# Patient Record
Sex: MALE | Race: WHITE | NOT HISPANIC OR LATINO | Employment: UNEMPLOYED | URBAN - METROPOLITAN AREA
[De-identification: names, ages, dates, MRNs, and addresses within clinical notes are randomized per-mention and may not be internally consistent; named-entity substitution may affect disease eponyms.]

---

## 2017-01-01 ENCOUNTER — COMMUNICATION - HEALTHEAST (OUTPATIENT)
Dept: FAMILY MEDICINE | Facility: CLINIC | Age: 32
End: 2017-01-01

## 2017-01-05 ENCOUNTER — COMMUNICATION - HEALTHEAST (OUTPATIENT)
Dept: FAMILY MEDICINE | Facility: CLINIC | Age: 32
End: 2017-01-05

## 2017-03-23 ENCOUNTER — COMMUNICATION - HEALTHEAST (OUTPATIENT)
Dept: FAMILY MEDICINE | Facility: CLINIC | Age: 32
End: 2017-03-23

## 2017-03-24 ENCOUNTER — AMBULATORY - HEALTHEAST (OUTPATIENT)
Dept: FAMILY MEDICINE | Facility: CLINIC | Age: 32
End: 2017-03-24

## 2017-03-24 DIAGNOSIS — F98.8 ATTENTION DEFICIT DISORDER: ICD-10-CM

## 2017-04-13 ENCOUNTER — COMMUNICATION - HEALTHEAST (OUTPATIENT)
Dept: FAMILY MEDICINE | Facility: CLINIC | Age: 32
End: 2017-04-13

## 2017-04-13 DIAGNOSIS — Z87.891 QUIT SMOKING: ICD-10-CM

## 2017-04-25 ENCOUNTER — COMMUNICATION - HEALTHEAST (OUTPATIENT)
Dept: FAMILY MEDICINE | Facility: CLINIC | Age: 32
End: 2017-04-25

## 2017-05-02 ENCOUNTER — OFFICE VISIT - HEALTHEAST (OUTPATIENT)
Dept: FAMILY MEDICINE | Facility: CLINIC | Age: 32
End: 2017-05-02

## 2017-05-02 DIAGNOSIS — F98.8 ATTENTION DEFICIT DISORDER: ICD-10-CM

## 2017-05-02 DIAGNOSIS — F32.9 MAJOR DEPRESSIVE DISORDER, SINGLE EPISODE, UNSPECIFIED: ICD-10-CM

## 2017-05-02 DIAGNOSIS — Z79.899 MEDICATION MANAGEMENT: ICD-10-CM

## 2017-05-02 DIAGNOSIS — F41.1 ANXIETY STATE: ICD-10-CM

## 2017-05-02 DIAGNOSIS — E66.9 OBESITY: ICD-10-CM

## 2017-05-02 DIAGNOSIS — G47.00 INSOMNIA, UNSPECIFIED: ICD-10-CM

## 2017-05-02 ASSESSMENT — MIFFLIN-ST. JEOR: SCORE: 1898.82

## 2017-05-29 ENCOUNTER — COMMUNICATION - HEALTHEAST (OUTPATIENT)
Dept: FAMILY MEDICINE | Facility: CLINIC | Age: 32
End: 2017-05-29

## 2017-05-29 DIAGNOSIS — Z87.891 QUIT SMOKING: ICD-10-CM

## 2017-06-02 ENCOUNTER — COMMUNICATION - HEALTHEAST (OUTPATIENT)
Dept: FAMILY MEDICINE | Facility: CLINIC | Age: 32
End: 2017-06-02

## 2017-06-02 DIAGNOSIS — F98.8 ATTENTION DEFICIT DISORDER: ICD-10-CM

## 2017-06-30 ENCOUNTER — COMMUNICATION - HEALTHEAST (OUTPATIENT)
Dept: FAMILY MEDICINE | Facility: CLINIC | Age: 32
End: 2017-06-30

## 2017-06-30 DIAGNOSIS — F98.8 ATTENTION DEFICIT DISORDER: ICD-10-CM

## 2017-06-30 DIAGNOSIS — Z87.891 QUIT SMOKING: ICD-10-CM

## 2017-07-02 ENCOUNTER — COMMUNICATION - HEALTHEAST (OUTPATIENT)
Dept: SCHEDULING | Facility: CLINIC | Age: 32
End: 2017-07-02

## 2017-07-02 DIAGNOSIS — Z87.891 QUIT SMOKING: ICD-10-CM

## 2017-07-02 DIAGNOSIS — F98.8 ATTENTION DEFICIT DISORDER: ICD-10-CM

## 2017-08-01 ENCOUNTER — COMMUNICATION - HEALTHEAST (OUTPATIENT)
Dept: FAMILY MEDICINE | Facility: CLINIC | Age: 32
End: 2017-08-01

## 2017-08-01 DIAGNOSIS — F98.8 ATTENTION DEFICIT DISORDER: ICD-10-CM

## 2017-08-29 ENCOUNTER — COMMUNICATION - HEALTHEAST (OUTPATIENT)
Dept: FAMILY MEDICINE | Facility: CLINIC | Age: 32
End: 2017-08-29

## 2017-08-29 DIAGNOSIS — F98.8 ATTENTION DEFICIT DISORDER: ICD-10-CM

## 2017-09-15 ENCOUNTER — COMMUNICATION - HEALTHEAST (OUTPATIENT)
Dept: FAMILY MEDICINE | Facility: CLINIC | Age: 32
End: 2017-09-15

## 2017-09-25 ENCOUNTER — OFFICE VISIT - HEALTHEAST (OUTPATIENT)
Dept: FAMILY MEDICINE | Facility: CLINIC | Age: 32
End: 2017-09-25

## 2017-09-25 DIAGNOSIS — Z00.00 HEALTH CARE MAINTENANCE: ICD-10-CM

## 2017-09-25 DIAGNOSIS — F98.8 ATTENTION DEFICIT DISORDER: ICD-10-CM

## 2017-09-25 DIAGNOSIS — F32.9 MAJOR DEPRESSIVE DISORDER, SINGLE EPISODE, UNSPECIFIED: ICD-10-CM

## 2017-09-25 DIAGNOSIS — F41.1 ANXIETY STATE: ICD-10-CM

## 2017-09-25 ASSESSMENT — MIFFLIN-ST. JEOR: SCORE: 1853.46

## 2017-10-02 ENCOUNTER — COMMUNICATION - HEALTHEAST (OUTPATIENT)
Dept: FAMILY MEDICINE | Facility: CLINIC | Age: 32
End: 2017-10-02

## 2017-10-03 ENCOUNTER — OFFICE VISIT - HEALTHEAST (OUTPATIENT)
Dept: FAMILY MEDICINE | Facility: CLINIC | Age: 32
End: 2017-10-03

## 2017-10-03 DIAGNOSIS — F98.8 ATTENTION DEFICIT DISORDER: ICD-10-CM

## 2017-10-03 ASSESSMENT — MIFFLIN-ST. JEOR: SCORE: 1857.09

## 2017-10-27 ENCOUNTER — COMMUNICATION - HEALTHEAST (OUTPATIENT)
Dept: FAMILY MEDICINE | Facility: CLINIC | Age: 32
End: 2017-10-27

## 2017-10-27 DIAGNOSIS — F98.8 ATTENTION DEFICIT DISORDER: ICD-10-CM

## 2017-11-25 ENCOUNTER — COMMUNICATION - HEALTHEAST (OUTPATIENT)
Dept: FAMILY MEDICINE | Facility: CLINIC | Age: 32
End: 2017-11-25

## 2017-11-25 DIAGNOSIS — F98.8 ATTENTION DEFICIT DISORDER: ICD-10-CM

## 2017-12-13 ENCOUNTER — COMMUNICATION - HEALTHEAST (OUTPATIENT)
Dept: FAMILY MEDICINE | Facility: CLINIC | Age: 32
End: 2017-12-13

## 2017-12-19 ENCOUNTER — COMMUNICATION - HEALTHEAST (OUTPATIENT)
Dept: FAMILY MEDICINE | Facility: CLINIC | Age: 32
End: 2017-12-19

## 2017-12-19 DIAGNOSIS — F98.8 ATTENTION DEFICIT DISORDER: ICD-10-CM

## 2018-01-26 ENCOUNTER — COMMUNICATION - HEALTHEAST (OUTPATIENT)
Dept: FAMILY MEDICINE | Facility: CLINIC | Age: 33
End: 2018-01-26

## 2018-01-26 DIAGNOSIS — F98.8 ATTENTION DEFICIT DISORDER: ICD-10-CM

## 2018-02-26 ENCOUNTER — COMMUNICATION - HEALTHEAST (OUTPATIENT)
Dept: FAMILY MEDICINE | Facility: CLINIC | Age: 33
End: 2018-02-26

## 2018-02-26 DIAGNOSIS — F41.1 ANXIETY STATE: ICD-10-CM

## 2018-02-26 DIAGNOSIS — F98.8 ATTENTION DEFICIT DISORDER: ICD-10-CM

## 2018-02-26 DIAGNOSIS — F32.9 MAJOR DEPRESSIVE DISORDER, SINGLE EPISODE: ICD-10-CM

## 2018-03-13 ENCOUNTER — COMMUNICATION - HEALTHEAST (OUTPATIENT)
Dept: FAMILY MEDICINE | Facility: CLINIC | Age: 33
End: 2018-03-13

## 2018-03-23 ENCOUNTER — COMMUNICATION - HEALTHEAST (OUTPATIENT)
Dept: FAMILY MEDICINE | Facility: CLINIC | Age: 33
End: 2018-03-23

## 2018-03-23 DIAGNOSIS — F32.9 MAJOR DEPRESSIVE DISORDER, SINGLE EPISODE: ICD-10-CM

## 2018-03-23 DIAGNOSIS — F41.1 ANXIETY STATE: ICD-10-CM

## 2018-03-23 DIAGNOSIS — F98.8 ATTENTION DEFICIT DISORDER: ICD-10-CM

## 2018-04-21 ENCOUNTER — COMMUNICATION - HEALTHEAST (OUTPATIENT)
Dept: FAMILY MEDICINE | Facility: CLINIC | Age: 33
End: 2018-04-21

## 2018-04-21 DIAGNOSIS — F98.8 ATTENTION DEFICIT DISORDER: ICD-10-CM

## 2018-04-23 ENCOUNTER — COMMUNICATION - HEALTHEAST (OUTPATIENT)
Dept: FAMILY MEDICINE | Facility: CLINIC | Age: 33
End: 2018-04-23

## 2018-04-23 DIAGNOSIS — F98.8 ATTENTION DEFICIT DISORDER: ICD-10-CM

## 2018-05-21 ENCOUNTER — COMMUNICATION - HEALTHEAST (OUTPATIENT)
Dept: FAMILY MEDICINE | Facility: CLINIC | Age: 33
End: 2018-05-21

## 2018-05-21 DIAGNOSIS — F98.8 ATTENTION DEFICIT DISORDER: ICD-10-CM

## 2018-06-18 ENCOUNTER — COMMUNICATION - HEALTHEAST (OUTPATIENT)
Dept: FAMILY MEDICINE | Facility: CLINIC | Age: 33
End: 2018-06-18

## 2018-06-18 DIAGNOSIS — F98.8 ATTENTION DEFICIT DISORDER: ICD-10-CM

## 2018-06-23 ENCOUNTER — COMMUNICATION - HEALTHEAST (OUTPATIENT)
Dept: FAMILY MEDICINE | Facility: CLINIC | Age: 33
End: 2018-06-23

## 2018-06-23 DIAGNOSIS — F98.8 ATTENTION DEFICIT DISORDER: ICD-10-CM

## 2018-06-25 ENCOUNTER — COMMUNICATION - HEALTHEAST (OUTPATIENT)
Dept: FAMILY MEDICINE | Facility: CLINIC | Age: 33
End: 2018-06-25

## 2018-06-25 ENCOUNTER — AMBULATORY - HEALTHEAST (OUTPATIENT)
Dept: FAMILY MEDICINE | Facility: CLINIC | Age: 33
End: 2018-06-25

## 2018-06-25 DIAGNOSIS — F98.8 ATTENTION DEFICIT DISORDER: ICD-10-CM

## 2018-07-17 ENCOUNTER — OFFICE VISIT - HEALTHEAST (OUTPATIENT)
Dept: FAMILY MEDICINE | Facility: CLINIC | Age: 33
End: 2018-07-17

## 2018-07-17 DIAGNOSIS — F41.1 ANXIETY STATE: ICD-10-CM

## 2018-07-17 DIAGNOSIS — F98.8 ATTENTION DEFICIT DISORDER: ICD-10-CM

## 2018-07-17 DIAGNOSIS — E66.9 OBESITY: ICD-10-CM

## 2018-07-17 DIAGNOSIS — F32.0 MILD SINGLE CURRENT EPISODE OF MAJOR DEPRESSIVE DISORDER (H): ICD-10-CM

## 2018-07-17 ASSESSMENT — MIFFLIN-ST. JEOR: SCORE: 1897.46

## 2018-07-22 ENCOUNTER — COMMUNICATION - HEALTHEAST (OUTPATIENT)
Dept: FAMILY MEDICINE | Facility: CLINIC | Age: 33
End: 2018-07-22

## 2018-07-22 DIAGNOSIS — F98.8 ATTENTION DEFICIT DISORDER: ICD-10-CM

## 2018-08-21 ENCOUNTER — COMMUNICATION - HEALTHEAST (OUTPATIENT)
Dept: FAMILY MEDICINE | Facility: CLINIC | Age: 33
End: 2018-08-21

## 2018-08-21 DIAGNOSIS — F98.8 ATTENTION DEFICIT DISORDER: ICD-10-CM

## 2018-09-05 ENCOUNTER — COMMUNICATION - HEALTHEAST (OUTPATIENT)
Dept: FAMILY MEDICINE | Facility: CLINIC | Age: 33
End: 2018-09-05

## 2018-09-14 ENCOUNTER — COMMUNICATION - HEALTHEAST (OUTPATIENT)
Dept: FAMILY MEDICINE | Facility: CLINIC | Age: 33
End: 2018-09-14

## 2018-09-14 DIAGNOSIS — F41.1 ANXIETY STATE: ICD-10-CM

## 2018-09-14 DIAGNOSIS — F32.0 MILD SINGLE CURRENT EPISODE OF MAJOR DEPRESSIVE DISORDER (H): ICD-10-CM

## 2018-09-20 ENCOUNTER — AMBULATORY - HEALTHEAST (OUTPATIENT)
Dept: FAMILY MEDICINE | Facility: CLINIC | Age: 33
End: 2018-09-20

## 2018-09-22 ENCOUNTER — RECORDS - HEALTHEAST (OUTPATIENT)
Dept: ADMINISTRATIVE | Facility: OTHER | Age: 33
End: 2018-09-22

## 2018-10-22 ENCOUNTER — COMMUNICATION - HEALTHEAST (OUTPATIENT)
Dept: FAMILY MEDICINE | Facility: CLINIC | Age: 33
End: 2018-10-22

## 2018-10-31 ENCOUNTER — COMMUNICATION - HEALTHEAST (OUTPATIENT)
Dept: FAMILY MEDICINE | Facility: CLINIC | Age: 33
End: 2018-10-31

## 2018-11-19 ENCOUNTER — AMBULATORY - HEALTHEAST (OUTPATIENT)
Dept: FAMILY MEDICINE | Facility: CLINIC | Age: 33
End: 2018-11-19

## 2018-12-20 ENCOUNTER — COMMUNICATION - HEALTHEAST (OUTPATIENT)
Dept: FAMILY MEDICINE | Facility: CLINIC | Age: 33
End: 2018-12-20

## 2019-06-11 ENCOUNTER — OFFICE VISIT - HEALTHEAST (OUTPATIENT)
Dept: FAMILY MEDICINE | Facility: CLINIC | Age: 34
End: 2019-06-11

## 2019-06-11 ENCOUNTER — RECORDS - HEALTHEAST (OUTPATIENT)
Dept: ADMINISTRATIVE | Facility: OTHER | Age: 34
End: 2019-06-11

## 2019-06-11 DIAGNOSIS — M75.51 BURSITIS OF RIGHT SHOULDER: ICD-10-CM

## 2019-06-11 ASSESSMENT — MIFFLIN-ST. JEOR: SCORE: 1748.23

## 2019-06-14 ENCOUNTER — RECORDS - HEALTHEAST (OUTPATIENT)
Dept: ADMINISTRATIVE | Facility: OTHER | Age: 34
End: 2019-06-14

## 2019-07-22 ENCOUNTER — OFFICE VISIT - HEALTHEAST (OUTPATIENT)
Dept: FAMILY MEDICINE | Facility: CLINIC | Age: 34
End: 2019-07-22

## 2019-07-22 DIAGNOSIS — M75.51 BURSITIS OF RIGHT SHOULDER: ICD-10-CM

## 2019-07-22 DIAGNOSIS — Z01.818 ENCOUNTER FOR PREOPERATIVE EXAMINATION FOR GENERAL SURGICAL PROCEDURE: ICD-10-CM

## 2019-07-22 DIAGNOSIS — D17.30 LIPOMA OF SKIN AND SUBCUTANEOUS TISSUE: ICD-10-CM

## 2019-07-22 LAB — HGB BLD-MCNC: 13.8 G/DL (ref 14–18)

## 2019-07-22 ASSESSMENT — MIFFLIN-ST. JEOR: SCORE: 1759.11

## 2019-07-25 ENCOUNTER — RECORDS - HEALTHEAST (OUTPATIENT)
Dept: ADMINISTRATIVE | Facility: OTHER | Age: 34
End: 2019-07-25

## 2019-07-29 ENCOUNTER — RECORDS - HEALTHEAST (OUTPATIENT)
Dept: ADMINISTRATIVE | Facility: OTHER | Age: 34
End: 2019-07-29

## 2019-07-29 LAB
LAB AP CHARGES (HE HISTORICAL CONVERSION): NORMAL
PATH REPORT.COMMENTS IMP SPEC: NORMAL
PATH REPORT.FINAL DX SPEC: NORMAL
PATH REPORT.GROSS SPEC: NORMAL
PATH REPORT.MICROSCOPIC SPEC OTHER STN: NORMAL
PATH REPORT.RELEVANT HX SPEC: NORMAL
RESULT FLAG (HE HISTORICAL CONVERSION): NORMAL

## 2019-12-14 ENCOUNTER — RECORDS - HEALTHEAST (OUTPATIENT)
Dept: ADMINISTRATIVE | Facility: OTHER | Age: 34
End: 2019-12-14

## 2019-12-21 ENCOUNTER — RECORDS - HEALTHEAST (OUTPATIENT)
Dept: ADMINISTRATIVE | Facility: OTHER | Age: 34
End: 2019-12-21

## 2020-03-06 ENCOUNTER — OFFICE VISIT - HEALTHEAST (OUTPATIENT)
Dept: FAMILY MEDICINE | Facility: CLINIC | Age: 35
End: 2020-03-06

## 2020-03-06 DIAGNOSIS — G44.201 ACUTE INTRACTABLE TENSION-TYPE HEADACHE: ICD-10-CM

## 2020-03-06 DIAGNOSIS — R73.9 HYPERGLYCEMIA: ICD-10-CM

## 2020-03-06 DIAGNOSIS — E83.42 HYPOMAGNESEMIA: ICD-10-CM

## 2020-03-13 ENCOUNTER — COMMUNICATION - HEALTHEAST (OUTPATIENT)
Dept: FAMILY MEDICINE | Facility: CLINIC | Age: 35
End: 2020-03-13

## 2020-03-13 DIAGNOSIS — G44.201 ACUTE INTRACTABLE TENSION-TYPE HEADACHE: ICD-10-CM

## 2020-03-25 ENCOUNTER — COMMUNICATION - HEALTHEAST (OUTPATIENT)
Dept: FAMILY MEDICINE | Facility: CLINIC | Age: 35
End: 2020-03-25

## 2020-03-25 DIAGNOSIS — R11.0 NAUSEA: ICD-10-CM

## 2020-03-25 DIAGNOSIS — R51.9 ACUTE INTRACTABLE HEADACHE, UNSPECIFIED HEADACHE TYPE: ICD-10-CM

## 2020-06-30 ENCOUNTER — COMMUNICATION - HEALTHEAST (OUTPATIENT)
Dept: FAMILY MEDICINE | Facility: CLINIC | Age: 35
End: 2020-06-30

## 2020-07-06 ENCOUNTER — COMMUNICATION - HEALTHEAST (OUTPATIENT)
Dept: FAMILY MEDICINE | Facility: CLINIC | Age: 35
End: 2020-07-06

## 2020-08-28 ENCOUNTER — COMMUNICATION - HEALTHEAST (OUTPATIENT)
Dept: FAMILY MEDICINE | Facility: CLINIC | Age: 35
End: 2020-08-28

## 2020-08-28 ENCOUNTER — OFFICE VISIT - HEALTHEAST (OUTPATIENT)
Dept: FAMILY MEDICINE | Facility: CLINIC | Age: 35
End: 2020-08-28

## 2020-08-28 DIAGNOSIS — E66.09 CLASS 2 OBESITY DUE TO EXCESS CALORIES WITHOUT SERIOUS COMORBIDITY WITH BODY MASS INDEX (BMI) OF 36.0 TO 36.9 IN ADULT: ICD-10-CM

## 2020-08-28 DIAGNOSIS — E66.812 CLASS 2 OBESITY DUE TO EXCESS CALORIES WITHOUT SERIOUS COMORBIDITY WITH BODY MASS INDEX (BMI) OF 36.0 TO 36.9 IN ADULT: ICD-10-CM

## 2020-08-28 DIAGNOSIS — R73.9 HYPERGLYCEMIA: ICD-10-CM

## 2020-08-28 DIAGNOSIS — E83.42 HYPOMAGNESEMIA: ICD-10-CM

## 2020-08-28 DIAGNOSIS — Z13.220 LIPID SCREENING: ICD-10-CM

## 2020-08-28 DIAGNOSIS — Z00.00 ROUTINE GENERAL MEDICAL EXAMINATION AT A HEALTH CARE FACILITY: ICD-10-CM

## 2020-08-28 DIAGNOSIS — F33.1 MODERATE EPISODE OF RECURRENT MAJOR DEPRESSIVE DISORDER (H): ICD-10-CM

## 2020-08-28 DIAGNOSIS — F90.2 ATTENTION DEFICIT HYPERACTIVITY DISORDER (ADHD), COMBINED TYPE: ICD-10-CM

## 2020-08-28 DIAGNOSIS — Z71.6 ENCOUNTER FOR SMOKING CESSATION COUNSELING: ICD-10-CM

## 2020-08-28 LAB
CHOLEST SERPL-MCNC: 218 MG/DL
FASTING STATUS PATIENT QL REPORTED: YES
FASTING STATUS PATIENT QL REPORTED: YES
GLUCOSE BLD-MCNC: 78 MG/DL (ref 70–125)
HBA1C MFR BLD: 4.5 %
HDLC SERPL-MCNC: 49 MG/DL
LDLC SERPL CALC-MCNC: 145 MG/DL
MAGNESIUM SERPL-MCNC: 1.9 MG/DL (ref 1.8–2.6)
TRIGL SERPL-MCNC: 118 MG/DL
TSH SERPL DL<=0.005 MIU/L-ACNC: 3.28 UIU/ML (ref 0.3–5)

## 2020-08-28 ASSESSMENT — PATIENT HEALTH QUESTIONNAIRE - PHQ9: SUM OF ALL RESPONSES TO PHQ QUESTIONS 1-9: 17

## 2020-08-28 ASSESSMENT — MIFFLIN-ST. JEOR: SCORE: 1965.38

## 2020-09-11 ENCOUNTER — COMMUNICATION - HEALTHEAST (OUTPATIENT)
Dept: SURGERY | Facility: CLINIC | Age: 35
End: 2020-09-11

## 2020-09-11 ENCOUNTER — OFFICE VISIT - HEALTHEAST (OUTPATIENT)
Dept: SURGERY | Facility: CLINIC | Age: 35
End: 2020-09-11

## 2020-09-11 DIAGNOSIS — E78.5 DYSLIPIDEMIA: ICD-10-CM

## 2020-09-11 DIAGNOSIS — G43.809 OTHER MIGRAINE WITHOUT STATUS MIGRAINOSUS, NOT INTRACTABLE: ICD-10-CM

## 2020-09-11 DIAGNOSIS — E66.9 OBESITY (BMI 35.0-39.9 WITHOUT COMORBIDITY): ICD-10-CM

## 2020-09-11 DIAGNOSIS — F41.9 ANXIETY: ICD-10-CM

## 2020-09-11 ASSESSMENT — MIFFLIN-ST. JEOR: SCORE: 1950.86

## 2020-09-15 ENCOUNTER — OFFICE VISIT - HEALTHEAST (OUTPATIENT)
Dept: SURGERY | Facility: CLINIC | Age: 35
End: 2020-09-15

## 2020-09-15 DIAGNOSIS — E66.9 OBESITY (BMI 30-39.9): ICD-10-CM

## 2020-10-16 ENCOUNTER — OFFICE VISIT - HEALTHEAST (OUTPATIENT)
Dept: SURGERY | Facility: CLINIC | Age: 35
End: 2020-10-16

## 2020-10-16 ENCOUNTER — COMMUNICATION - HEALTHEAST (OUTPATIENT)
Dept: SURGERY | Facility: CLINIC | Age: 35
End: 2020-10-16

## 2020-10-16 DIAGNOSIS — E78.5 DYSLIPIDEMIA: ICD-10-CM

## 2020-10-16 DIAGNOSIS — E66.9 OBESITY (BMI 35.0-39.9 WITHOUT COMORBIDITY): ICD-10-CM

## 2020-10-16 ASSESSMENT — MIFFLIN-ST. JEOR: SCORE: 1923.65

## 2020-11-30 ENCOUNTER — COMMUNICATION - HEALTHEAST (OUTPATIENT)
Dept: PHARMACY | Facility: CLINIC | Age: 35
End: 2020-11-30

## 2020-11-30 DIAGNOSIS — F90.2 ATTENTION DEFICIT HYPERACTIVITY DISORDER (ADHD), COMBINED TYPE: ICD-10-CM

## 2020-12-02 ENCOUNTER — COMMUNICATION - HEALTHEAST (OUTPATIENT)
Dept: FAMILY MEDICINE | Facility: CLINIC | Age: 35
End: 2020-12-02

## 2020-12-02 DIAGNOSIS — F90.2 ATTENTION DEFICIT HYPERACTIVITY DISORDER (ADHD), COMBINED TYPE: ICD-10-CM

## 2020-12-04 ENCOUNTER — OFFICE VISIT - HEALTHEAST (OUTPATIENT)
Dept: SURGERY | Facility: CLINIC | Age: 35
End: 2020-12-04

## 2020-12-04 DIAGNOSIS — E78.5 DYSLIPIDEMIA: ICD-10-CM

## 2020-12-04 DIAGNOSIS — E66.9 OBESITY (BMI 35.0-39.9 WITHOUT COMORBIDITY): ICD-10-CM

## 2020-12-04 ASSESSMENT — MIFFLIN-ST. JEOR: SCORE: 1987.15

## 2020-12-18 ENCOUNTER — COMMUNICATION - HEALTHEAST (OUTPATIENT)
Dept: SURGERY | Facility: CLINIC | Age: 35
End: 2020-12-18

## 2021-03-04 ENCOUNTER — AMBULATORY - HEALTHEAST (OUTPATIENT)
Dept: SURGERY | Facility: CLINIC | Age: 36
End: 2021-03-04

## 2021-03-04 DIAGNOSIS — E78.5 DYSLIPIDEMIA: ICD-10-CM

## 2021-03-04 DIAGNOSIS — E66.9 OBESITY (BMI 35.0-39.9 WITHOUT COMORBIDITY): ICD-10-CM

## 2021-03-04 RX ORDER — PHENTERMINE HYDROCHLORIDE 37.5 MG/1
TABLET ORAL
Qty: 90 TABLET | Refills: 1 | Status: SHIPPED | OUTPATIENT
Start: 2021-03-04 | End: 2022-01-28

## 2021-04-02 ENCOUNTER — OFFICE VISIT - HEALTHEAST (OUTPATIENT)
Dept: SURGERY | Facility: CLINIC | Age: 36
End: 2021-04-02

## 2021-04-02 ENCOUNTER — COMMUNICATION - HEALTHEAST (OUTPATIENT)
Dept: SURGERY | Facility: CLINIC | Age: 36
End: 2021-04-02

## 2021-04-02 DIAGNOSIS — R63.8 ABNORMAL CRAVING: ICD-10-CM

## 2021-04-02 DIAGNOSIS — E66.9 OBESITY (BMI 35.0-39.9 WITHOUT COMORBIDITY): ICD-10-CM

## 2021-04-02 RX ORDER — NALTREXONE HYDROCHLORIDE 50 MG/1
25-50 TABLET, FILM COATED ORAL DAILY
Qty: 90 TABLET | Status: SHIPPED | OUTPATIENT
Start: 2021-04-02 | End: 2022-01-28

## 2021-04-02 ASSESSMENT — MIFFLIN-ST. JEOR: SCORE: 1996.22

## 2021-05-03 ENCOUNTER — AMBULATORY - HEALTHEAST (OUTPATIENT)
Dept: NURSING | Facility: CLINIC | Age: 36
End: 2021-05-03

## 2021-05-24 ENCOUNTER — AMBULATORY - HEALTHEAST (OUTPATIENT)
Dept: NURSING | Facility: CLINIC | Age: 36
End: 2021-05-24

## 2021-05-27 ASSESSMENT — PATIENT HEALTH QUESTIONNAIRE - PHQ9: SUM OF ALL RESPONSES TO PHQ QUESTIONS 1-9: 17

## 2021-05-29 NOTE — PROGRESS NOTES
Assessment and Plan:     1. Bursitis of right shoulder  Discussed symptomatic treatment including rest, ice, NSAIDs.  Patient is not interested work restrictions at this time.  He would like to see orthopedics for further guidance.  Discussed possible physical therapy and cortisone injection.  He is content with the plan.  - Ambulatory referral to Orthopedics    Subjective:     Seamus is a 33 y.o. male presenting to the clinic for concerns for right shoulder pain since January.  Patient works for the [employer].  He left work on 1/7/2019 due to right shoulder pain.  He was seen at walk-in clinic through [facility] where he was diagnosed with bursitis.  X-ray showed no acute fracture.  He was provided limited duty restrictions.  Since then, he has been experiencing sharp intermittent pain with abduction of the shoulder.  He has been working without restrictions but feels as though his symptoms are worsening.  He has been taking ibuprofen as needed.  He denies history of injury in the past.  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  Rest assists the pain.    Review of Systems: A complete 14 point review of systems was obtained and is negative or as stated in the history of present illness.    Social History     Socioeconomic History     Marital status: Single     Spouse name: Not on file     Number of children: Not on file     Years of education: Not on file     Highest education level: Not on file   Occupational History     Not on file   Social Needs     Financial resource strain: Not on file     Food insecurity:     Worry: Not on file     Inability: Not on file     Transportation needs:     Medical: Not on file     Non-medical: Not on file   Tobacco Use     Smoking status: Former Smoker     Smokeless tobacco: Never Used   Substance and Sexual Activity     Alcohol use: No     Drug use: No     Sexual activity: Yes     Partners: Female     Comment: in relationship    Lifestyle     Physical activity:     Days  "per week: Not on file     Minutes per session: Not on file     Stress: Not on file   Relationships     Social connections:     Talks on phone: Not on file     Gets together: Not on file     Attends Buddhism service: Not on file     Active member of club or organization: Not on file     Attends meetings of clubs or organizations: Not on file     Relationship status: Not on file     Intimate partner violence:     Fear of current or ex partner: Not on file     Emotionally abused: Not on file     Physically abused: Not on file     Forced sexual activity: Not on file   Other Topics Concern     Not on file   Social History Narrative    Works as political organizers.       Active Ambulatory Problems     Diagnosis Date Noted     Major depressive disorder, single episode      Anxiety      ADHD, Predominantly Inattentive Type      Insomnia      Deviated Nasal Septum (Acquired)      Hypertrophied Nasal Turbinate      Controlled substance agreement signed 11/16/2015     Resolved Ambulatory Problems     Diagnosis Date Noted     Skin Neoplasm Of Uncertain Behavior      Cellulitis of hand, left 01/04/2017     Cat bite, initial encounter      Past Medical History:   Diagnosis Date     ADD (attention deficit disorder)        Family History   Problem Relation Age of Onset     No Medical Problems Mother      No Medical Problems Father        Objective:     BP 94/58   Pulse 65   Ht 5' 7\" (1.702 m)   Wt 186 lb 3.2 oz (84.5 kg)   SpO2 97%   BMI 29.16 kg/m      Patient is alert, in no obvious distress.   Skin: Warm, dry.    Lungs:  Clear to auscultation. Respirations even and unlabored.  No wheezing or rales noted.   Heart:  Regular rate and rhythm.  No murmurs.   Musculoskeletal:  He has full ROM of his right shoulder.  He has obvious swelling within the bursa.  Neer's impingement and Reynolds are negative.              "

## 2021-05-30 ENCOUNTER — RECORDS - HEALTHEAST (OUTPATIENT)
Dept: ADMINISTRATIVE | Facility: CLINIC | Age: 36
End: 2021-05-30

## 2021-05-30 VITALS — WEIGHT: 219.4 LBS | HEIGHT: 67 IN | BODY MASS INDEX: 34.44 KG/M2

## 2021-05-30 NOTE — PATIENT INSTRUCTIONS - HE
Hold all supplements, aspirin and NSAIDs for 7 days prior to surgery.  Follow your surgeon's direction on when to stop eating and drinking prior to surgery.  Your surgeon will be managing your pain after your surgery.    Remove all jewelry and metal piercings before your surgery.

## 2021-05-30 NOTE — PROGRESS NOTES
Preoperative Exam    Scheduled Procedure: Right shoulder surgery  Surgery Date:7/25/19  Surgery Location: [facility]    Surgeon:      Assessment/Plan:     1. Encounter for preoperative examination for general surgical procedure  Patient will hold NSAIDs for 7 days prior to surgery.  - Hemoglobin    2. Lipoma of skin and subcutaneous tissue    3. Bursitis of right shoulder      Surgical Procedure Risk: Intermediate (reported cardiac risk generally 1-5%)  Have you had prior anesthesia?: Yes  Have you or any family members had a previous anesthesia reaction:  No  Do you or any family members have a history of a clotting or bleeding disorder?: No  Cardiac Risk Assessment: no increased risk for major cardiac complications    APPROVAL GIVEN to proceed with proposed procedure, without further diagnostic evaluation    Functional Status: Independent  Patient plans to recover at home with family.     Subjective:      Seamus Pereira is a 33 y.o. male who presents for a preoperative consultation.  Patient works for the [employer].  He left work on 1/7/2019 due to right shoulder pain.  He was seen at walk-in clinic through [facility] where he was diagnosed with bursitis.  X-ray showed no acute fracture.  Patient then saw [facility] where he received a cortisone injection and completed physical therapy.  Due to lack of improvement in symptoms, he had an ultrasound recently showing a lipoma.  He continues to experience intermittent pain primarily with abduction.  He has been taking ibuprofen as needed.    He has a history of ADHD, anxiety, depression, insomnia.  He is not currently taking medication.    All other systems reviewed and are negative, other than those listed in the HPI.    Pertinent History  Do you have difficulty breathing or chest pain after walking up a flight of stairs: No  History of obstructive sleep apnea: No  Steroid use in the last 6 months: No  Frequent Aspirin/NSAID use:  No  Prior Blood Transfusion: No  Prior Blood Transfusion Reaction: No  If for some reason prior to, during or after the procedure, if it is medically indicated, would you be willing to have a blood transfusion?:  There is no transfusion refusal.    No current outpatient medications on file.     No current facility-administered medications for this visit.         Allergies   Allergen Reactions     Amoxapine      Patient unsure of this allergy     Amoxicillin Unknown     Unknown reaction as a baby  Pt states unsure of reaction-childhood        Patient Active Problem List   Diagnosis     Major depressive disorder, single episode     Anxiety     ADHD, Predominantly Inattentive Type     Insomnia     Deviated Nasal Septum (Acquired)     Hypertrophied Nasal Turbinate     Controlled substance agreement signed       Past Medical History:   Diagnosis Date     ADD (attention deficit disorder)        Past Surgical History:   Procedure Laterality Date     KNEE ARTHROSCOPY Right      NC REMOVE TONSILS/ADENOIDS,12+ Y/O      Description: Tonsillectomy With Adenoidectomy Over Age 12;  Recorded: 02/15/2008;       Social History     Socioeconomic History     Marital status: Single     Spouse name: Not on file     Number of children: Not on file     Years of education: Not on file     Highest education level: Not on file   Occupational History     Not on file   Social Needs     Financial resource strain: Not on file     Food insecurity:     Worry: Not on file     Inability: Not on file     Transportation needs:     Medical: Not on file     Non-medical: Not on file   Tobacco Use     Smoking status: Former Smoker     Smokeless tobacco: Never Used   Substance and Sexual Activity     Alcohol use: No     Drug use: No     Sexual activity: Yes     Partners: Female     Comment: in relationship    Lifestyle     Physical activity:     Days per week: Not on file     Minutes per session: Not on file     Stress: Not on file   Relationships      "Social connections:     Talks on phone: Not on file     Gets together: Not on file     Attends Jain service: Not on file     Active member of club or organization: Not on file     Attends meetings of clubs or organizations: Not on file     Relationship status: Not on file     Intimate partner violence:     Fear of current or ex partner: Not on file     Emotionally abused: Not on file     Physically abused: Not on file     Forced sexual activity: Not on file   Other Topics Concern     Not on file   Social History Narrative    Works as political organizers.       Patient Care Team:  Elvira Rodas CNP as PCP - General          Objective:     Vitals:    07/22/19 1421   BP: 102/62   Pulse: (!) 54   SpO2: 98%   Weight: 188 lb 9.6 oz (85.5 kg)   Height: 5' 7\" (1.702 m)         Physical Exam:  Physical Exam   /62   Pulse (!) 54   Ht 5' 7\" (1.702 m)   Wt 188 lb 9.6 oz (85.5 kg)   SpO2 98%   BMI 29.54 kg/m      General Appearance:    Alert, cooperative, no distress, appears stated age   Head:    Normocephalic, without obvious abnormality, atraumatic   Eyes:    PERRL, conjunctiva/corneas clear, EOM's intact, fundi     benign, both eyes        Ears:    Normal TM's and external ear canals, both ears   Nose:   Nares normal, septum midline, mucosa normal, no drainage    or sinus tenderness   Throat:   Lips, mucosa, and tongue normal; teeth and gums normal   Neck:   Supple, symmetrical, trachea midline, no adenopathy;        thyroid:  No enlargement/tenderness/nodules; no carotid    bruit or JVD   Back:     Symmetric, no curvature, ROM normal, no CVA tenderness   Lungs:     Clear to auscultation bilaterally, respirations unlabored   Chest wall:    No tenderness or deformity   Heart:    Regular rate and rhythm, S1 and S2 normal, no murmur, rub    or gallop   Abdomen:     Soft, non-tender, bowel sounds active all four quadrants,     no masses, no organomegaly   Genitalia:    deferred   Rectal:    deferred "   Extremities:   He has a golf ball size soft tissue mass noted within his posterior lateral right shoulder     Pulses:   2+ and symmetric all extremities   Skin:   Skin color, texture, turgor normal, no rashes or lesions   Lymph nodes:   Cervical, supraclavicular, and axillary nodes normal   Neurologic:   CNII-XII intact. Normal strength, sensation and reflexes       throughout       Patient Instructions     Hold all supplements, aspirin and NSAIDs for 7 days prior to surgery.  Follow your surgeon's direction on when to stop eating and drinking prior to surgery.  Your surgeon will be managing your pain after your surgery.    Remove all jewelry and metal piercings before your surgery.       Labs:  Recent Results (from the past 24 hour(s))   Hemoglobin    Collection Time: 07/22/19  2:48 PM   Result Value Ref Range    Hemoglobin 13.8 (L) 14.0 - 18.0 g/dL       Immunization History   Administered Date(s) Administered     Influenza, seasonal,quad inj 36+ mos 09/25/2017     Influenza, seasonal,quad inj 6-35 mos 10/14/2014     Tdap 08/16/2011           Electronically signed by Elvira Rodas CNP 07/22/19 2:24 PM

## 2021-05-31 ENCOUNTER — RECORDS - HEALTHEAST (OUTPATIENT)
Dept: ADMINISTRATIVE | Facility: CLINIC | Age: 36
End: 2021-05-31

## 2021-05-31 VITALS — HEIGHT: 67 IN | BODY MASS INDEX: 32.99 KG/M2 | WEIGHT: 210.2 LBS

## 2021-05-31 VITALS — HEIGHT: 67 IN | WEIGHT: 209.4 LBS | BODY MASS INDEX: 32.87 KG/M2

## 2021-06-01 VITALS — WEIGHT: 219.1 LBS | HEIGHT: 67 IN | BODY MASS INDEX: 34.39 KG/M2

## 2021-06-03 VITALS — WEIGHT: 186.2 LBS | HEIGHT: 67 IN | BODY MASS INDEX: 29.22 KG/M2

## 2021-06-03 VITALS — BODY MASS INDEX: 29.6 KG/M2 | HEIGHT: 67 IN | WEIGHT: 188.6 LBS

## 2021-06-04 VITALS
DIASTOLIC BLOOD PRESSURE: 62 MMHG | BODY MASS INDEX: 34.46 KG/M2 | WEIGHT: 220 LBS | HEART RATE: 76 BPM | SYSTOLIC BLOOD PRESSURE: 114 MMHG | OXYGEN SATURATION: 99 %

## 2021-06-04 VITALS
HEART RATE: 75 BPM | SYSTOLIC BLOOD PRESSURE: 128 MMHG | WEIGHT: 233.2 LBS | DIASTOLIC BLOOD PRESSURE: 70 MMHG | OXYGEN SATURATION: 99 % | BODY MASS INDEX: 36.6 KG/M2 | HEIGHT: 67 IN

## 2021-06-04 VITALS — HEIGHT: 67 IN | WEIGHT: 230 LBS | BODY MASS INDEX: 36.1 KG/M2

## 2021-06-05 VITALS — HEIGHT: 67 IN | BODY MASS INDEX: 35.16 KG/M2 | WEIGHT: 224 LBS

## 2021-06-05 VITALS — BODY MASS INDEX: 37.35 KG/M2 | WEIGHT: 238 LBS | HEIGHT: 67 IN

## 2021-06-05 VITALS — HEIGHT: 67 IN | BODY MASS INDEX: 37.67 KG/M2 | WEIGHT: 240 LBS

## 2021-06-06 NOTE — TELEPHONE ENCOUNTER
RN cannot approve Refill Request    RN can NOT refill this medication med is not covered by policy/route to provider     . Last office visit: 3/6/2020 Elvira Rodas CNP Last Physical: 7/22/2019 Last MTM visit: Visit date not found Last visit same specialty: 3/6/2020 Elvira Rodas CNP.  Next visit within 3 mo: Visit date not found  Next physical within 3 mo: Visit date not found      Giselle Haney, [facility] Triage/Med Refill 3/16/2020    Requested Prescriptions   Pending Prescriptions Disp Refills     methocarbamoL (ROBAXIN) 500 MG tablet [Pharmacy Med Name: METHOCARBAMOL 500MG TABLETS] 30 tablet 0     Sig: TAKE 1 TABLET BY MOUTH THREE TIMES DAILY AS NEEDED.       There is no refill protocol information for this order

## 2021-06-06 NOTE — PROGRESS NOTES
Hospital Follow-up Visit:    Assessment/Plan:     1. Acute intractable tension-type headache  methocarbamoL (ROBAXIN) 500 MG tablet   2. Hypomagnesemia  Magnesium   3. Hyperglycemia  Glycosylated Hemoglobin A1c     Differentials include tension headache, migraine headache, cluster headache, dehydration.  Patient is experiencing some tension within his neck.  Will treat with methocarbamol as needed.  Discussed symptomatic treatment including rest, ice, stretching activities.  He will follow-up in 2 to 3 weeks to recheck magnesium, check A1c due to mild hyperglycemia during hospitalization.  He is to increase fluid intake.  He is prescribed sumatriptan to use as needed for the headache and Zofran to use as needed for the nausea.  He is to follow-up if symptoms persist or worsen.       Subjective:     Seamus Pereira is a 34 y.o. male who presents for a hospital discharge follow up.  Patient developed headaches over the past week.  He initially had what he perceived to be a migraine headache which lasted for 1 day.  He had nausea without vomiting.  Wednesday, he came home from a job interview.  He laid down and felt as though the muscles in his neck were tense.  He had numbness within his left arm.  He presented to the emergency room with severe occipital headache, nausea, vomiting, confusion, disorientation.  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  He received multiple medications including Toradol, Benadryl, Decadron, lorazepam, Compazine.  Patient had normal brain MRI/MRA.  Neurology suspected possible migraine and patient was discharged home with sumatriptan, Excedrin, Zofran.  Patient states his headache did resolve yesterday.  He denies headache today.  He still feels some neck tension.  He was noted to have hypomagnesemia and was prescribed magnesium gluconate 54 mg to take at bedtime.  He was also noted to have mild  hyperglycemia.      Hospital/Nursing Home/IP Rehab Facility: [facility]  Date of Admission: 3/4/20  Date of Discharge:3/5/20  Reason(s) for Admission:headache            Do you have any problems taking your medication regularly?  None       Have you had any changes in your medication since discharge? None       Have you had any difficulty following your discharge or treatment plan?  No    Summary of hospitalization:  Hospital discharge summary cannot be reviewed as it is not complete   Diagnostic Tests/Treatments reviewed.  Follow up needed: None  Other Healthcare Providers Involved in Patient's Care: Patient Care Team:  Elvira Rodas CNP as PCP - General  Elvira Rodas CNP as Assigned PCP      Update since discharge: {improved   Information from family, SNF, care coordination: none     Post Discharge Medication Reconciliation: discharge medications reconciled, continue medications without change  Plan of care communicated with: patient    Objective:     Vitals:    03/06/20 0903   BP: 114/62   Pulse: 76   SpO2: 99%   Weight: 220 lb (99.8 kg)         Physical Exam:  Physical Examination: GENERAL ASSESSMENT: active, alert, no acute distress, well hydrated, well nourished  SKIN: no lesions, jaundice, petechiae, pallor, cyanosis, ecchymosis  HEAD: Atraumatic, normocephalic  EYES: PERRL  EOM intact  EARS: bilateral TM's and external ear canals normal  NOSE: nasal mucosa, septum, turbinates normal bilaterally  MOUTH: mucous membranes moist and normal tonsils  NECK: supple, full range of motion, no mass, normal lymphadenopathy, no thyromegaly  CHEST: clear to auscultation, no wheezes, rales, or rhonchi, no tachypnea, retractions, or cyanosis  LUNGS: Respiratory effort normal, clear to auscultation, normal breath sounds bilaterally  HEART: Regular rate and rhythm, normal S1/S2, no murmurs, normal pulses and capillary fill  EXTREMITY: Normal muscle tone. All joints with full range of motion. No deformity or  tenderness.  NEURO: cranial nerves 2-12 normal, gross motor exam normal by observation, strength normal and symmetric, DTR normal for age      Coding guidelines for this visit:  Type of Medical   Decision Making Face-to-Face Visit       within 7 Days of discharge Face-to-Face Visit        within 14 days of discharge   Moderate Complexity [id number]   High Complexity [id number]       Electronically signed by Elvira Rodas CNP 03/06/20 9:08 AM

## 2021-06-10 NOTE — PROGRESS NOTES
Seamus is a 31 y.o. male presenting to the clinic for medication management.  Patient has ADHD and is taking Adderall XR 20 mg daily.  He denies any side effects from the medication.  He feels as though the medication assists him with staying on task.  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  He is trying to find a job.  He has had 2 job interviews recently.  He would like to run for political [employer] next year.  Patient has a girlfriend and they have a 2-year-old daughter.  She is working as a pharmacy tech at [employer].  He denies thoughts of suicide.  He does have anxiety and depression.  He is currently taking bupropion and states this is well controlled.  He had gained weight which is causing him some anxiety.  He lost 40 pounds recently by eating healthy and exercising.  He is trying to quit smoking.  He is currently using a 21 mg patch and has not smoked for 9 months.    Review of Systems: A complete 14 point review of systems was obtained and is negative or as stated in the history of present illness.    Social History     Social History     Marital status: Single     Spouse name: N/A     Number of children: N/A     Years of education: N/A     Occupational History     Not on file.     Social History Main Topics     Smoking status: Former Smoker     Smokeless tobacco: Not on file     Alcohol use No     Drug use: No     Sexual activity: Yes     Partners: Female      Comment: in relationship      Other Topics Concern     Not on file     Social History Narrative    Works as political organizers.       Active Ambulatory Problems     Diagnosis Date Noted     Major Depression, Single Episode      Anxiety      ADHD, Predominantly Inattentive Type      Insomnia      Deviated Nasal Septum (Acquired)      Hypertrophied Nasal Turbinate      Skin Neoplasm Of Uncertain Behavior      Controlled substance agreement signed 11/16/2015     Cellulitis of hand, left 01/04/2017     Cat bite, initial encounter      Resolved Ambulatory  "Problems     Diagnosis Date Noted     No Resolved Ambulatory Problems     Past Medical History:   Diagnosis Date     ADD (attention deficit disorder)        Family History   Problem Relation Age of Onset     No Medical Problems Mother      No Medical Problems Father        OBJECTIVE:     /64 (Patient Site: Left Arm, Patient Position: Sitting, Cuff Size: Adult Large)  Pulse 68  Ht 5' 7\" (1.702 m)  Wt 219 lb 6.4 oz (99.5 kg)  SpO2 98%  BMI 34.36 kg/m2    Patient is alert, in no obvious distress.   Skin: Warm, dry.    Lungs:  Clear to auscultation. Respirations even and unlabored.  No wheezing or rales noted.   Heart:  Regular rate and rhythm.  No murmurs.   Abdomen: Soft, nontender.  No organomegaly. Bowel sounds normoactive. No guarding or masses noted.      ASSESSMENT AND PLAN:     1. ADHD, Predominantly Inattentive Type  dextroamphetamine-amphetamine (ADDERALL XR) 20 MG 24 hr capsule   2. Anxiety     3. Major Depression, Single Episode     4. Insomnia     5. Obesity     6. Medication management  Basic Metabolic Panel     Patient continues Adderall XR 20 mg daily.  Obtain new controlled substance agreement.  He continues bupropion for anxiety and depression. Obtained PHQ9 score of 5.  Discussed good sleep hygiene.  He will continue to consume a healthy diet and exercise.  Recommend follow-up in 6 months for medication management or sooner with any further concerns.  He is content with the plan.  The following high BMI interventions were performed this visit: exercise promotion: strength training, exercise promotion: stretching and nutrition therapy    "

## 2021-06-10 NOTE — PROGRESS NOTES
Assessment and Plan:     1. Routine general medical examination at a health care facility  Discussed consuming a healthy diet and exercising.  He is up-to-date on vaccinations.    2. Lipid screening  - Lipid Cascade    3. Hyperglycemia  He has a history of hyperglycemia.  Will check A1c and notify patient of results.  - Glucose  - Glycosylated Hemoglobin A1c    4. Hypomagnesemia  He has a history of hypomagnesemia.  He is not currently taking supplementation.  - Magnesium    5. Class 2 obesity due to excess calories without serious comorbidity with body mass index (BMI) of 36.0 to 36.9 in adult  We will check thyroid cascade.  Discussed weight loss goals.  Will refer to weight loss clinic.  - Ambulatory referral to Bariatric Care: Surgical and Non-Surgical  - Thyroid Cascade    6. Attention deficit hyperactivity disorder (ADHD), combined type  Discussed treatment options.  Will start bupropion  mg daily.  Educated on its indications and side effects.  He is to follow-up in 3 months.  This may assist with weight loss and smoking cessation.  - buPROPion (WELLBUTRIN XL) 150 MG 24 hr tablet; Take 1 tablet (150 mg total) by mouth daily.  Dispense: 90 tablet; Refill: 0    7. Encounter for smoking cessation counseling  Patient is ready to quit smoking.  I spent 5 minutes with patient discussing smoking cessation options.  Provided prescription for nicotine patches, use as directed.  Educated on indications and side effects.  He is to follow-up in 3 months.    - nicotine (NICODERM CQ) 21 mg/24 hr; Place 1 patch on the skin daily.  Dispense: 30 patch; Refill: 1  - nicotine (NICODERM CQ) 14 mg/24 hr; Place 1 patch on the skin daily.  Dispense: 30 patch; Refill: 0  - nicotine (NICODERM CQ) 7 mg/24 hr; Place 1 patch on the skin daily.  Dispense: 30 patch; Refill: 0    8.  Moderate major depression  Obtained PHQ 9 score of 17.  Discussed treatment options.  Will start bupropion  mg daily.  Educated on its indications  and side effects including increased risk of suicide.  He is to follow-up in 3 months.  He is content with the plan.        Subjective:     Seamus is a 35 y.o. male presenting to the clinic for for a male physical.  Patient has been a relationship for 6 years.  He is currently engaged.  He has a 5-year-old daughter.  He is trying to eat healthy, but admits to overeating.  He has tried intermittent fasting.  He continues to gain weight.  He has untreated ADHD and is interested in starting treatment today.  He feels as though he has some impulsivity with food consumption.  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  He applied for numerous jobs prior to COVID.  He has had difficulty keeping keeping a job since.  He is waiting for a job offer today.  He has had difficulty sleeping due to this.  He has financial concerns.  He does smoke 1 pack/day and is ready to quit.    Review of Systems: A complete 14 point review of systems was obtained and is negative or as stated in the history of present illness.    Social History     Socioeconomic History     Marital status: Single     Spouse name: Not on file     Number of children: Not on file     Years of education: Not on file     Highest education level: Not on file   Occupational History     Not on file   Social Needs     Financial resource strain: Not on file     Food insecurity     Worry: Not on file     Inability: Not on file     Transportation needs     Medical: Not on file     Non-medical: Not on file   Tobacco Use     Smoking status: Current Every Day Smoker     Smokeless tobacco: Never Used   Substance and Sexual Activity     Alcohol use: No     Drug use: No     Sexual activity: Yes     Partners: Female     Comment: in relationship    Lifestyle     Physical activity     Days per week: Not on file     Minutes per session: Not on file     Stress: Not on file   Relationships     Social connections      "Talks on phone: Not on file     Gets together: Not on file     Attends [employer] service: Not on file     Active member of club or organization: Not on file     Attends meetings of clubs or organizations: Not on file     Relationship status: Not on file     Intimate partner violence     Fear of current or ex partner: Not on file     Emotionally abused: Not on file     Physically abused: Not on file     Forced sexual activity: Not on file   Other Topics Concern     Not on file   Social History Narrative    Works as political organizers.       Active Ambulatory Problems     Diagnosis Date Noted     ADHD, Predominantly Inattentive Type      Deviated Nasal Septum (Acquired)      Hypertrophied Nasal Turbinate      Controlled substance agreement signed 11/16/2015     Headache 03/04/2020     Resolved Ambulatory Problems     Diagnosis Date Noted     Major depressive disorder, single episode      Anxiety      Insomnia      Skin Neoplasm Of Uncertain Behavior      Cellulitis of hand, left 01/04/2017     Cat bite, initial encounter      Past Medical History:   Diagnosis Date     ADD (attention deficit disorder)        Family History   Problem Relation Age of Onset     No Medical Problems Mother      No Medical Problems Father        Objective:     /70 (Patient Site: Right Arm, Cuff Size: Adult Large)   Pulse 75   Ht 5' 7.25\" (1.708 m)   Wt (!) 233 lb 3.2 oz (105.8 kg)   SpO2 99%   BMI 36.25 kg/m      General Appearance:    Alert, cooperative, no distress, appears stated age   Head:    Normocephalic, without obvious abnormality, atraumatic   Eyes:    PERRL, conjunctiva/corneas clear, EOM's intact, fundi     benign, both eyes        Ears:    Normal TM's and external ear canals, both ears   Nose:   Nares normal, septum midline, mucosa normal, no drainage    or sinus tenderness   Throat:   Lips, mucosa, and tongue normal; teeth and gums normal   Neck:   Supple, symmetrical, trachea midline, no adenopathy;        " thyroid:  No enlargement/tenderness/nodules; no carotid    bruit or JVD   Back:     Symmetric, no curvature, ROM normal, no CVA tenderness   Lungs:     Clear to auscultation bilaterally, respirations unlabored   Chest wall:    No tenderness or deformity   Heart:    Regular rate and rhythm, S1 and S2 normal, no murmur, rub    or gallop   Abdomen:     Soft, non-tender, bowel sounds active all four quadrants,     no masses, no organomegaly   Genitalia:    Deferred per patient    Rectal:    deferred   Extremities:   Extremities normal, atraumatic, no cyanosis or edema   Pulses:   2+ and symmetric all extremities   Skin:   Skin color, texture, turgor normal, no rashes or lesions   Lymph nodes:   Cervical, supraclavicular, and axillary nodes normal   Neurologic:   CNII-XII intact. Normal strength, sensation and reflexes       throughout

## 2021-06-11 NOTE — PROGRESS NOTES
"   Seamus Pereira is a 35 y.o. male who is being evaluated via a billable video visit.      The patient has been notified of following:     \"This video visit will be conducted via a call between you and your physician/provider. We have found that certain health care needs can be provided without the need for an in-person physical exam.  This service lets us provide the care you need with a video conversation.  If a prescription is necessary we can send it directly to your pharmacy.  If lab work is needed we can place an order for that and you can then stop by our lab to have the test done at a later time.    Video visits are billed at different rates depending on your insurance coverage. Please reach out to your insurance provider with any questions.    If during the course of the call the physician/provider feels a video visit is not appropriate, you will not be charged for this service.\"    Patient has given verbal consent to a Video visit? Yes  How would you like to obtain your AVS? AVS Preference: MyChart.  If dropped by the video visit, the video invitation should be sent to: Send to e-mail at: [email or web address]  Will anyone else be joining your video visit? No        Video Start Time: 4:00 pm    Additional provider notes:        Video-Visit Details    Type of service:  Video Visit    Video End Time (time video stopped): 4:30 pm  Originating Location (pt. Location): Home    Distant Location (provider location):  [facility] GENERAL SURGERY AND BARIATRICS CARE     Platform used for Video Visit: Scotty Royal RD      Medical  Weight Loss Initial Diet Evaluation  Seamus is presenting today for a new weight management nutrition consultation. Pt has had an initial appointment with Dr. Samuel.  Weight loss medication: Phentermine.   Weight Loss Goal: around 165 lbs  Nutrition Assessment:   Anthropometrics:  Pt's Initial Weight: 230 lbs  Weight: (!) 230 lb (104.3 kg)  Weight loss from initial: " 0  % Weight loss: 0 %    BMI: There were no vitals filed for this visit.   IBW: 147-157 lb  Estimated RMR (Langlade-St Jeor equation): 1953 kcals x 1.3 (light active) = 2538 kcals (for weight maintenance)   Recommended Protein Intake: 120-130 grams of protein/day  Medical History:  Patient Active Problem List   Diagnosis     ADHD, Predominantly Inattentive Type     Deviated Nasal Septum (Acquired)     Hypertrophied Nasal Turbinate     Controlled substance agreement signed     Headache     Obesity (BMI 35.0-39.9 without comorbidity)     Dyslipidemia     Anxiety     Migraine      Nutrition History:   Food allergies/intolerances/cultural or religous food customs: No  Weight loss history: He has had several past supervised and unsupervised weight loss attempts; most lost 50 lbs  Dietary Recall:  Was doing intermittent fasting and eating Lunch and Dinner - stopped doing this per Dr. Lizzie RIZZO typical meal includes: L: salad with chicken, sardines, olive oil, cheese D: spaghetti    Breakfast: Bulletproof coffee - half tablespoon butter, half cup milk and coffee  Snack: none  Lunch: Salad and leftover chili  Snack: none  Dinner: Nachos with chili   Snack: Typically does not snack - yesterday did have handful of almond and banana  Eating out (frequency/week): 0-1  Hydration (type/oz. per day):  Water: drinks a bottle of water in the morning - 3-6 bottles of water per day  Caffeine: bulletproof coffee   Carbonation: diet coke with lunch and dinner  Exercise:  Routine exercise established: Yes  Current physical activity routine includes: treadmill in the am for 20 min, weighted vest, has bands and uses those 2-3X/wk    Nutrition Diagnosis (PES statement):   Overweight/Obesity (NC 3.3) related to excessive calorie intake as evidenced by BMI 35.76  Nutrition Intervention:  1. Food and/or Nutrient Delivery   a. Placed emphasis on importance of developing a healthy meal routine, aiming for 3 meals a day..  b. Discussed using a  protein supplement as a meal replacement and discussed examples  2. Nutrition Education   a. Educated on sources of lean protein, portion sizes, the amount of grams found in each source. Recommend patient to aim for 30-40g protein at each meal or 5-6 oz per meal.  b. Discussed various methods to monitor calorie control and approaches to weight loss.  3. Nutrition Counseling   a. Encouraged importance of developing routine exercise for health benefits and weight loss.    Goals established by patient:   1. Recommend 120-130 g protein per day or 5-6 oz at 3 meals daily.  2. Recommend tracking strictly for 3 days to get a sense of current calorie intake. Aim for 8041-8437 kcals consistently for weight loss.  Assessment/Plan:    Pt will follow up in 1 month(s) with bariatrician and as needed with dietitian, per patient request.     Maggie Royal RD

## 2021-06-11 NOTE — PROGRESS NOTES
"Seamus Pereira is a 35 y.o. male who is being evaluated via a billable video visit.      The patient has been notified of following:     \"This video visit will be conducted via a call between you and your physician/provider. We have found that certain health care needs can be provided without the need for an in-person physical exam.  This service lets us provide the care you need with a video conversation.  If a prescription is necessary we can send it directly to your pharmacy.  If lab work is needed we can place an order for that and you can then stop by our lab to have the test done at a later time.    Video visits are billed at different rates depending on your insurance coverage. Please reach out to your insurance provider with any questions.    If during the course of the call the physician/provider feels a video visit is not appropriate, you will not be charged for this service.\"    Patient has given verbal consent to a Video visit? Yes  How would you like to obtain your AVS? AVS Preference: MyChart.  If dropped by the video visit, the video invitation should be sent to: Send to e-mail at: [email or web address]  Will anyone else be joining your video visit? No        Video Start Time: 11:15 AM    Additional provider notes:   BARIATRIC CONSULTATION    Impression: Seamus Pereira is a 35 y.o. year old male with  has a past medical history of ADD (attention deficit disorder), ADHD, Anxiety, Dyslipidemia, Joint pain, and Obesity (BMI 35.0-39.9 without comorbidity).  Poor functional capacity and musculoskeletal disability in the setting of the abovementioned weight related co-morbidities. His Body mass index is 35.76 kg/m ..    Plan: DIET: introduced 3 meals, protein first and discussed evidence based rationale, insulin response   EXERCISE continue consistent treadmill use in the morning with and without weighted vest, bands 2-3X/wk, quantify steps to be sure 8-10K   REFERRAL(S) " "Dietitian   PHARMACOTHERAPY  Phentermine may help ADD as well as appetite. If added topamax in the future, may be beneficial for migraine. Naltrexone would also be a nice option given history of tobacco and remote alcohol use. All discussed and literature provided. He will try phentermine first and f/u in 1 month.    We discussed [facility] Bariatric Basics including:  -eating 3 meals daily  -eating protein first  -eating slowly, chewing food well  -avoiding/limiting calorie containing beverages  -choosing wheat, not white with breads, crackers, pastas, chloe, bagels, tortillas, rice  -limiting restaurant or cafeteria eating to twice a week or less    We discussed the importance of restorative sleep and stress management in maintaining a healthy weight.    We reviewed medications associated with weight gain.    We discussed insulin resistance and glycemic index as it relates to appetite and weight control.     We discussed the National Weight Control Registry healthy weight maintenance strategies and ways to optimize metabolism.  We discussed the importance of physical activity including cardiovascular and strength training in maintaining a healthier weight and explored viable options.    We discussed medications available for weight loss including Phentermine, Phendimetrazine, Topamax, Qsymia, Lorcaserin, Diethylproprion, Orlistat, Contrave, Saxenda, and Vyvanse. We discussed the risks and benefits of each. We discussed indications, contraindications, potential side effects, and estimated costs of each. Literature was provided. Gus understands that not using a weight loss medication is an option.      History Surrounding Consultation  Struggles with weight started at age elementary \"I was overweight and out of shape\"  His weight at age 20 was 170# to 180#  He has had several past supervised and unsupervised weight loss attempts  The most weight lost was: 50#  Unfortunately there was not durable weight " "maintenance.  History of bulimia, anorexia, or binge eating disorder? no  If Present has eating disorder been in remission at least 3 years? NA  Night time eating? no    Dietary History- has gone up and down in weight for his whole life. Became sober at 20 and got healthier. Ranges from 170# to 220# up and down  Meals per day: 2 Lunch and dinner (intermittent fasting)  Snacks: tries not to  Typical Snack: beef sticks, cookies  Who does the grocery shopping? He does  Who does the cooking? He does  A typical meal includes: L: salad with chicken, sardines, olive oil, cheese D: spaghetti  Regular Pop: diet soda  Juice: no  Caffeine: 1-2c/d  Amount of restaurant eating per week: 0-1  Eating a the table with the TV off? yes    Physical Activity Patterns  Current physical activity routine includes: treadmill in the am for 20 min, weighted vest, has bands and uses those 2-3X/wk    Limitations from being physically active on a regular basis includes: nothing    He describes his general health as: good    Past Medical History  HTN: no  Dyslipidemia: yes  HATTIE: no  Obesity Hypoventilation: NO  DM2: no DM1: no DX: no Most recent AIC: 4.2  Neuropathy: no  Nephropathy: no  Retinopathy: no Glaucoma no  IFG or \"pre-DM\": no  MI: no  CVA:no  CHF: no  Heart Valves: native  Previous cardiac testing includes:   Cancers: no  Kidney Disease: no  DVT: no  PE: no  Colitis: no  Crohn's: no  IBS: no  PUD: no  Fatty Liver: no  Abnormal LFTs: no  Hepatitis: no  Asthma: no  Bronchitis: no  Pneumonia: no  Other Lung Problems: no  Back Pain:yes  DDD: no  Gout: no  Fibromyalgia: no  USI: no  Severe Headaches: migraine with hemiplegia  Seizures: no If so, last seizure: no  Pseudotumor: no  PCOS: NA  Menstrual Irregularity: NA  Menorrhagia: NA  Infertility: NA  Thyroid problems: no  Thyroid medications: no  HIV positive: NO  MRSA/VRE history: no  History of Blood transfusion: no  Anemia: no    Health Care Maintenance  Colonoscopy: NA  Mammogram: " NA  Pap: NA    Medications   Current Outpatient Medications   Medication Sig Dispense Refill     buPROPion (WELLBUTRIN XL) 150 MG 24 hr tablet Take 1 tablet (150 mg total) by mouth daily. 90 tablet 0     nicotine (NICODERM CQ) 14 mg/24 hr Place 1 patch on the skin daily. 30 patch 0     nicotine (NICODERM CQ) 21 mg/24 hr Place 1 patch on the skin daily. 30 patch 1     nicotine (NICODERM CQ) 7 mg/24 hr Place 1 patch on the skin daily. 30 patch 0     phentermine (ADIPEX-P) 37.5 mg tablet Take 1/2 to 1 tablet in the morning. 90 tablet 0     No current facility-administered medications for this visit.      Allergies   Amoxapine and Amoxicillin  Past Surgical History  Past Surgical History:   Procedure Laterality Date     KNEE ARTHROSCOPY Right      AK REMOVE TONSILS/ADENOIDS,14+ Y/O      Description: Tonsillectomy With Adenoidectomy Over Age 12;  Recorded: 02/15/2008;     History of problems with anesthesia: no  History of Malignant Hyperthermia: NO    Family History  family history includes Cancer in his brother; No Medical Problems in his father and mother.    Social History  Status: Engaged for 5 years  Children: one daughter  Work Status: Working now [employer] for the [employer] and going to school      Addiction History  Smoking History:   Started smoking: age 18 Quit smoking: one week ago Total years of tobacco use: 18 yrs on and off  Alcohol use: none  Current or Past history of alcohol or substance abuse: yes  Last used: age [age]   Chemical Dependency Treatment History: out patient  Chemicals: alcohol    Psychiatric History  Diagnoses: anxiety  Treated by: exercise  Psychiatric Hospitalizations: no  Suicide attempts: no  ECT: no  Panic attacks: no  History of Abuse: no    Palliative Medicine History  Involvement in a pain clinic: no    ROS  Sleep  Snoring: on and off  PND: no  Witnessed Apneas: no  Jessup: 8  STOP BAN/8  General  Fatigue: yes  Sleep Quality:insomnia-hard time falling asleep has tried  "melatonin and RX medications  HEENT  Visual changes: no  Gastrointestinal  Heartburn: no  Dysphagia: no  Cardiovascular  Murmur: no  Elevated BP: no  Chest Pain with Exertion: no  Dyspnea with Exertion: no  Palpitations: no  Lower Extremity Edema: no  Syncope: no  Pulmonary  Shortness of breath at rest: no  Snoring: rare  PND: nono  Wheezing: no  CPAP use: no  Gastrointestinal  Trouble swallowing:no  Heartburn: no  HX UGI/EGD: no  Abdominal pain: no  Hematochezia: no  Urologic  Hesitancy: no  Urgency: no  Genitourinary  ED: no  Menorrhagia: NA  Dysmenorrhea: NA  Neurologic  Severe headache:yes  Paresthesias: no  Psychiatric  Moods Stable: stable  Hallucinations: no  Rheumatologic  Myalgias: mild  Arthralgias: mild  Endocrine  Polydipsia: no  Polyuria: no  Galactorrhea: no  Heat intolerance: no  Hirsutism: no  Musculoskeletal  Joint pain;yes  Falls: no  Use of cane, crutch or motorized scooter: no  Hematologic  Abnormal Bleeding or Clotting: no  Dermatologic  Skin Tags: yes  Striae: no  Furuncless: no  Acne: no  Intertrigo: no  Lower Leg ulcers: no      Physical Exam  Height: 5' 7.25\" (1.708 m) (9/11/2020 11:00 AM)  Initial Weight: 230 lbs (9/11/2020 11:00 AM)  Weight: (!) 230 lb (104.3 kg) (9/11/2020 11:00 AM)  Weight loss from initial: 0 (9/11/2020 11:00 AM)  % Weight loss: 0 % (9/11/2020 11:00 AM)  BMI (Calculated): 35.8 (9/11/2020 11:00 AM)  SpO2: 99 % (8/28/2020  9:45 AM)        General Appearance  No acute distress. Obesity: central  Alert: yes  Sleepy: no  HEENT  PERRLA, EOMI  Neck  Stout: no   Airway: 1+  Cardiovascular  Color pink  Pulmonary  Morrow Score: 8  Lungs :non labored respirations  Abdomen    Extremities:  Neurologic  Tremors: no  Psychiatric  Thought Content Organized  Mood appears stable  Endocrine  Moon Facies: NO  Dorsal Thoracic Prominence: NO  Skin tags: yes  Acanthosis nigricans: no  Dermatologic  Intertrigo: no      Video-Visit Details    Type of service:  Video Visit    Video End Time " (time video stopped): 12:17 PM  Originating Location (pt. Location): Home    Distant Location (provider location):  [facility] GENERAL SURGERY AND BARIATRICS CARE     Platform used for Video Visit: Scotty Samuel MD

## 2021-06-12 NOTE — PROGRESS NOTES
"Seamus Pereira is a 35 y.o. male who is being evaluated via a billable video visit.      The patient has been notified of following:     \"This video visit will be conducted via a call between you and your physician/provider. We have found that certain health care needs can be provided without the need for an in-person physical exam.  This service lets us provide the care you need with a video conversation.  If a prescription is necessary we can send it directly to your pharmacy.  If lab work is needed we can place an order for that and you can then stop by our lab to have the test done at a later time.    Video visits are billed at different rates depending on your insurance coverage. Please reach out to your insurance provider with any questions.    If during the course of the call the physician/provider feels a video visit is not appropriate, you will not be charged for this service.\"    Patient has given verbal consent to a Video visit? Yes  How would you like to obtain your AVS? AVS Preference: MyChart.  If dropped by the video visit, the video invitation should be sent to: Text to cell phone: [phone]  Will anyone else be joining your video visit? No        Video Start Time: 11:15 am    Additional provider notes:   Bariatric Follow-up    35 y.o.  male BMI:Body mass index is 34.82 kg/m .    Weight:   Wt Readings from Last 1 Encounters:   10/16/20 (!) 224 lb (101.6 kg)    pounds  Height: 5' 7.25\" (1.708 m) (10/16/2020 10:00 AM)  Initial Weight: 230 lbs (10/16/2020 10:00 AM)  Weight: (!) 224 lb (101.6 kg) (10/16/2020 10:00 AM)  Weight loss from initial: 6 (10/16/2020 10:00 AM)  % Weight loss: 2.61 % (10/16/2020 10:00 AM)  BMI (Calculated): 34.8 (10/16/2020 10:00 AM)  SpO2: 99 % (8/28/2020  9:45 AM)      Comorbidities:  Patient Active Problem List   Diagnosis     ADHD, Predominantly Inattentive Type     Deviated Nasal Septum (Acquired)     Hypertrophied Nasal Turbinate     Controlled substance agreement " "signed     Headache     Obesity (BMI 35.0-39.9 without comorbidity)     Dyslipidemia     Anxiety     Migraine         Interim: 6# and 11# overall down. Work is hectic. Schedule has made it difficult to plan meals. He met with the dietitian once and does not feel he is implementing the advice he was given and wants to give that a chance before seeing her again.       Plan:  DIET  Work toward 3 structured meals with predominantly lean proteins, fruits, veggies, whole grains. Consider Premier Protein in the am or as a \"grab and go\" option.   EXERCISE continue morning treadmill routine and resistant bands for strength training   PHARMACOTHERAPY continue phentermine in the am.    recommend dietitian f/u to build skills, reinforce positive changes, accountability and gain meal planning, portion perspective, etc.. Keep momentum going with positive changes so far. Recheck LDL next summer. Anticipate it will have improved with increasing fruits, veggies, more lean proteins, consistent physical activity and weight loss.     -We reviewed his medications and whether associated with weight gain.  Current Outpatient Medications on File Prior to Visit   Medication Sig Dispense Refill     buPROPion (WELLBUTRIN XL) 150 MG 24 hr tablet Take 1 tablet (150 mg total) by mouth daily. 90 tablet 0     nicotine (NICODERM CQ) 21 mg/24 hr Place 1 patch on the skin daily. 30 patch 1     nicotine (NICODERM CQ) 7 mg/24 hr Place 1 patch on the skin daily. 30 patch 0     phentermine (ADIPEX-P) 37.5 mg tablet Take 1/2 to 1 tablet in the morning. 90 tablet 0     No current facility-administered medications on file prior to visit.         We discussed [facility] Bariatric Basics including:  -eating 3 meals daily  -eating protein first  -eating slowly, chewing food well  -avoiding/limiting calorie containing beverages  -choosing wheat, not white with breads, crackers, pastas, chloe, bagels, tortillas, rice  -limiting restaurant or cafeteria " "eating to twice a week or less  -We discussed the importance of restorative sleep and stress management in maintaining a healthy weight.  -We discussed insulin resistance and glycemic index as it relates to appetite and weight control  -We discussed the National Weight Control Registry healthy weight maintenance strategies and ways to optimize metabolism.  -We discussed the importance of physical activity including cardiovascular and strength training in maintaining a healthier weight and explored viable options.    Most recent labs:  Lab Results   Component Value Date    WBC 8.2 01/04/2017    HGB 13.8 (L) 07/22/2019    HCT 42.1 01/04/2017    MCV 81 01/04/2017     01/04/2017     Lab Results   Component Value Date    CHOL 218 (H) 08/28/2020     Lab Results   Component Value Date    HDL 49 08/28/2020     Lab Results   Component Value Date    LDLCALC 145 (H) 08/28/2020     Lab Results   Component Value Date    TRIG 118 08/28/2020     No results found for: ALT, AST, GGT, ALKPHOS, BILITOT  Lab Results   Component Value Date    HGBA1C 4.5 08/28/2020       Lab Results   Component Value Date    TSH 3.28 08/28/2020         DIETARY HISTORY  Meals Per Day: trying but work is hectic right now  Eating Protein First?: not always  Food Diary: B:coffee with protein powder and milk L:salad mixed with D:spaghetti took his time and had a moderate portion  Snacks Per Day: \"I have been lately\"  Typical Snack: sugary things like oreos  Fluid Intake: one coffee, one milk, quite a bit of water most days of the week, occ diet coke  Portion Control: improved  Calorie Containing Beverages: milk  Alcohol per week: no  Typical Protein Food Choices: variety  Choosing Whole Grains: no  Grocery Shopping is done by: he does  Food Preparation is done by: he does by [employer]  Meals at Restaurant/Cafeteria/Take Out Per Week: 0-1  Eating at the Table: yes  TV is Off During Meals: yes    Positive Changes Since Last Visit: has lost 6# and 11# " "overall  Struggling With: late night snacks    Knowledgeable in Reading Food Labels: getting there  Getting Adequate Protein: yes  Sleeping 7-8 hours/day OK for the most part. Tries to go to bed at 11. Up at 6:30-7  Stress management treadmill in the morning    PHYSICAL ACTIVITY PATTERNS:  Cardiovascular: treadmill most morings  Strength Training: not lately but has resistance band and board    REVIEW OF SYSTEMS  GENERAL/CONSTITUTIONAL:  Fatigue: yes  CARDIOVASCULAR:  Chest Pain with Exertion: no  PULMONARY:  Dyspnea on exertion: no  CPAP Use: no  Tobacco Use: no  GASTROINTESTINAL:  GERD/Heartburn: no  UROLOGIC:  Urinary Symptoms: no  NEUROLOGIC:  Headaches: occ  Paresthesias: no  PSYCHIATRIC:  Moods: OK  MUSCULOSKELETAL/RHEUMATOLOGIC  Arthralgias: OK  Myalgias: OK  ENDOCRINE:  Monitoring Blood Sugars: no  Sugars Well Controlled: yes  DERMATOLOGIC:  Rashes: no    PHYSICAL EXAM: (most recent vitals and today's stated weight)  Vitals: Ht 5' 7.25\" (1.708 m)   Wt (!) 224 lb (101.6 kg)   BMI 34.82 kg/m    Height: 5' 7.25\" (1.708 m) (10/16/2020 10:00 AM)  Initial Weight: 230 lbs (10/16/2020 10:00 AM)  Weight: (!) 224 lb (101.6 kg) (10/16/2020 10:00 AM)  Weight loss from initial: 6 (10/16/2020 10:00 AM)  % Weight loss: 2.61 % (10/16/2020 10:00 AM)  BMI (Calculated): 34.8 (10/16/2020 10:00 AM)  SpO2: 99 % (8/28/2020  9:45 AM)      GEN: Pleasant and in no acute distress  PSYCH: A&OX3,     I have reviewed the note as documented above.  This accurately captures the substance of my conversation with the patient.  Thank you for the opportunity to participate in the care of your patient.    Joyce Samuel MD, FAAFP  [facility]  Diplomate, American Board of Obesity Medicine        Video-Visit Details    Type of service:  Video Visit    Video End Time (time video stopped): 11:39  Originating Location (pt. Location): Home    Distant Location (provider location):  [facility] SURGERY [facility] AND " BARIATRICS [facility]     Platform used for Video Visit: Collin Samuel MD

## 2021-06-13 NOTE — TELEPHONE ENCOUNTER
Attempts made to contact patient in regards to appointment scheduled for today with this writer at 3 pm.  Patient did not link onto video visit nor answer the phone with attempts made.  Therefore left patient a detailed message including contact information for the call center to call and reschedule this appointment at his earliest convenience.

## 2021-06-13 NOTE — PROGRESS NOTES
Assessment and Plan:     1. Attention deficit disorder  I completed accommodations paperwork for his workplace requesting that he receive written or emailed instructions to him.  Please see forms for further details. He is content with the plan.  I spent 15 minutes with the patient with greater than 50% spent discussing symptoms, treatment options, and coordination of care.    Subjective:     Seamus is a 32 y.o. male presenting to the clinic for accommodations paperwork for his workplace.  Patient has ADD and has had difficulty focusing at work.  He works as a  for [employer] and states he is not completing tasks in a timely manner.  He has difficulty editing communications and forgot to cancel a [employer] advertisement which cost the organization money.  If his manager provides verbal instructions, he has difficulty completing the tasks.  He is requesting that they provide written or emailed instructions to him.  He has paperwork here in the clinic today.    Review of Systems: A complete 14 point review of systems was obtained and is negative or as stated in the history of present illness.    Social History     Social History     Marital status: Single     Spouse name: N/A     Number of children: N/A     Years of education: N/A     Occupational History     Not on file.     Social History Main Topics     Smoking status: Former Smoker     Smokeless tobacco: Not on file     Alcohol use No     Drug use: No     Sexual activity: Yes     Partners: Female      Comment: in relationship      Other Topics Concern     Not on file     Social History Narrative    Works as political organizers.       Active Ambulatory Problems     Diagnosis Date Noted     Major Depression, Single Episode      Anxiety      ADHD, Predominantly Inattentive Type      Insomnia      Deviated Nasal Septum (Acquired)      Hypertrophied Nasal Turbinate      Controlled substance agreement signed 11/16/2015     Resolved Ambulatory  "Problems     Diagnosis Date Noted     Skin Neoplasm Of Uncertain Behavior      Cellulitis of hand, left 01/04/2017     Cat bite, initial encounter      Past Medical History:   Diagnosis Date     ADD (attention deficit disorder)        Family History   Problem Relation Age of Onset     No Medical Problems Mother      No Medical Problems Father        Objective:     /68 (Patient Site: Right Arm, Patient Position: Sitting, Cuff Size: Adult Large)  Pulse 90  Ht 5' 7\" (1.702 m)  Wt 210 lb 3.2 oz (95.3 kg)  SpO2 98%  BMI 32.92 kg/m2    Patient is alert, in no obvious distress.   Skin: Warm, dry.    Other exam deferred per counseling.               "

## 2021-06-13 NOTE — PROGRESS NOTES
"Seamus Pereira is a 35 y.o. male who is being evaluated via a billable video visit.      The patient has been notified of following:     \"This video visit will be conducted via a call between you and your physician/provider. We have found that certain health care needs can be provided without the need for an in-person physical exam.  This service lets us provide the care you need with a video conversation.  If a prescription is necessary we can send it directly to your pharmacy.  If lab work is needed we can place an order for that and you can then stop by our lab to have the test done at a later time.    Video visits are billed at different rates depending on your insurance coverage. Please reach out to your insurance provider with any questions.    If during the course of the call the physician/provider feels a video visit is not appropriate, you will not be charged for this service.\"    Patient has given verbal consent to a Video visit? Yes  How would you like to obtain your AVS? AVS Preference: E-Mail (Inform patient AVS not encrypted with this option).  If dropped by the video visit, the video invitation should be sent to: Send to e-mail at: [email or web address]  Will anyone else be joining your video visit? No        Video Start Time: 10:45 am    Additional provider notes:   Bariatric Follow-up    35 y.o.  male BMI:Body mass index is 37 kg/m .    Weight:   Wt Readings from Last 1 Encounters:   12/04/20 (!) 238 lb (108 kg)    pounds  Height: 5' 7.25\" (1.708 m) (12/4/2020 10:00 AM)  Initial Weight: 230 lbs (12/4/2020 10:00 AM)  Weight: (!) 238 lb (108 kg) (12/4/2020 10:00 AM)  Weight loss from initial: -8 (12/4/2020 10:00 AM)  % Weight loss: -3.48 % (12/4/2020 10:00 AM)  BMI (Calculated): 37 (12/4/2020 10:00 AM)  SpO2: 99 % (8/28/2020  9:45 AM)      Comorbidities:  Patient Active Problem List   Diagnosis     ADHD, Predominantly Inattentive Type     Deviated Nasal Septum (Acquired)     Hypertrophied Nasal " "Turbinate     Controlled substance agreement signed     Headache     Obesity (BMI 35.0-39.9 without comorbidity)     Dyslipidemia     Anxiety     Migraine         Interim: 8# up from intro. Work is stressful. Not sleeping well. Staying up late and not walking on the treadmill in the mornings. 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    Plan:  DIET  Regular. Protein first. When eating, choose healthy foods most of the time.   EXERCISE restart walking on the treadmill again if even for 15 min in the am before work.   PHARMACOTHERAPY phentermine 37.5mg daily    Once finals have ended, dietitian visit with new goal setting and \"restart\" will be helpful. Protect sleep     -We reviewed his medications and whether associated with weight gain.  Current Outpatient Medications on File Prior to Visit   Medication Sig Dispense Refill     buPROPion (WELLBUTRIN XL) 150 MG 24 hr tablet Take 1 tablet (150 mg total) by mouth daily. 90 tablet 0     nicotine (NICODERM CQ) 21 mg/24 hr Place 1 patch on the skin daily. 30 patch 1     nicotine (NICODERM CQ) 7 mg/24 hr Place 1 patch on the skin daily. 30 patch 0     [DISCONTINUED] phentermine (ADIPEX-P) 37.5 mg tablet Take 1/2 to 1 tablet in the morning. 90 tablet 0     No current facility-administered medications on file prior to visit.         We discussed [facility] Bariatric Basics including:  -eating 3 meals daily  -eating protein first  -eating slowly, chewing food well  -avoiding/limiting calorie containing beverages  -choosing wheat, not white with breads, crackers, pastas, chloe, bagels, tortillas, rice  -limiting restaurant or cafeteria eating to twice a week or less  -We discussed the importance of restorative sleep and stress management in maintaining a healthy weight.  -We discussed insulin resistance and glycemic index as it relates to appetite and weight control  -We discussed the National Weight Control Registry healthy weight maintenance strategies and " ways to optimize metabolism.  -We discussed the importance of physical activity including cardiovascular and strength training in maintaining a healthier weight and explored viable options.    Most recent labs:  Lab Results   Component Value Date    WBC 8.2 01/04/2017    HGB 13.8 (L) 07/22/2019    HCT 42.1 01/04/2017    MCV 81 01/04/2017     01/04/2017     Lab Results   Component Value Date    CHOL 218 (H) 08/28/2020     Lab Results   Component Value Date    HDL 49 08/28/2020     Lab Results   Component Value Date    LDLCALC 145 (H) 08/28/2020     Lab Results   Component Value Date    TRIG 118 08/28/2020       Lab Results   Component Value Date    HGBA1C 4.5 08/28/2020       Lab Results   Component Value Date    TSH 3.28 08/28/2020         DIETARY HISTORY  Meals Per Day: not structured, skips breakfast  Eating Protein First?: no  Food Diary: B:skips L: leftovers or ordering take out D:rice and ground beef  Snacks Per Day: yes  Typical Snack: sugary foods-girl  cookies  Fluid Intake: not enough  Portion Control: problematic  Calorie Containing Beverages: no  Alcohol per week: no  Typical Protein Food Choices: variety  Choosing Whole Grains: yes  Grocery Shopping is done by: shared  Food Preparation is done by: shared  Meals at Restaurant/Cafeteria/Take Out Per Week: more lately  Eating at the Table: sometimes  TV is Off During Meals: sometimes    Positive Changes Since Last Visit: trying WW pasta,   Struggling With: sleep and high stress    Knowledgeable in Reading Food Labels: yes  Getting Adequate Protein: yes  Sleeping 7-8 hours/day yes  Stress management high stress    PHYSICAL ACTIVITY PATTERNS:  Cardiovascular: none  Strength Training: none    REVIEW OF SYSTEMS  GENERAL/CONSTITUTIONAL:  Fatigue: yes  CARDIOVASCULAR:  Chest Pain with Exertion: no  PULMONARY:  Dyspnea on exertion: no  CPAP Use:   Tobacco Use:   GASTROINTESTINAL:  GERD/Heartburn:   UROLOGIC:  Urinary Symptoms:  "no  NEUROLOGIC:  Headaches:   Paresthesias: no  PSYCHIATRIC:  Moods: OK  MUSCULOSKELETAL/RHEUMATOLOGIC  Arthralgias:   Myalgias:   ENDOCRINE:  Monitoring Blood Sugars: no  Sugars Well Controlled: yes  DERMATOLOGIC:  Rashes: no    PHYSICAL EXAM: (most recent vitals and today's stated weight)  Vitals: Ht 5' 7.25\" (1.708 m)   Wt (!) 238 lb (108 kg)   BMI 37.00 kg/m    Height: 5' 7.25\" (1.708 m) (12/4/2020 10:00 AM)  Initial Weight: 230 lbs (12/4/2020 10:00 AM)  Weight: (!) 238 lb (108 kg) (12/4/2020 10:00 AM)  Weight loss from initial: -8 (12/4/2020 10:00 AM)  % Weight loss: -3.48 % (12/4/2020 10:00 AM)  BMI (Calculated): 37 (12/4/2020 10:00 AM)  SpO2: 99 % (8/28/2020  9:45 AM)      GEN: Pleasant and in no acute distress  PSYCH: A&OX3,     I have reviewed the note as documented above.  This accurately captures the substance of my conversation with the patient.  Thank you for the opportunity to participate in the care of your patient.    Joyce Samuel MD, FAAFP  [facility]  Diplomate, American Board of Obesity Medicine    Video-Visit Details    Type of service:  Video Visit    Video End Time (time video stopped): 11:19 AM  Originating Location (pt. Location): Home    Distant Location (provider location):  [facility] SURGERY CLINIC AND BARIATRICS CARE [place]     Platform used for Video Visit: Scotty Samuel MD    "

## 2021-06-13 NOTE — TELEPHONE ENCOUNTER
Received refill request from [facility] for bupropion.  Patient was last seen by PCP August 2020, therefore we will send a 9-month supply.

## 2021-06-13 NOTE — PROGRESS NOTES
Assessment and Plan:   1. ADHD, Predominantly Inattentive Type  dextroamphetamine-amphetamine (ADDERALL XR) 30 MG 24 hr capsule   2. Anxiety  buPROPion (WELLBUTRIN XL) 300 MG 24 hr tablet   3. Major Depression, Single Episode  buPROPion (WELLBUTRIN XL) 300 MG 24 hr tablet   4. Health care maintenance  Influenza, Seasonal,Quad Inj, 36+ MOS     We will increase Adderall dose to 30 mg XR daily to assist with ADD symptoms.  Educated on its indications and side effects. Will monitor use. Will also increase Wellbutrin to 300 mg XR daily to assist with concentration, anxiety, and depression.  Obtained PHQ 9 score of 9.  Will have patient follow-up in 1 month for medication management or sooner with any further concerns.  He is content with the plan.      Subjective:     Seamus is a 32 y.o. male presenting to the clinic for medication management.  Patient has ADD and is currently taking Adderall XR 20 mg daily.  He tolerates the medication well and denies any side effects.  He is working for [email or web address] within [employer].  Patient states he is making mistakes and believes he is on the verge of being fired.  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  Patient states he forgot to stop the [employer] add which was charging money.  He does take the Adderall daily.  Patient denies thoughts of suicide but is feeling hopeless.  He would like to increase his dose of Wellbutrin.  He is not smoking.  He has been a relationship for 3 years.  He has a 2-year-old at home.  He has added stress at home.    Review of Systems: A complete 14 point review of systems was obtained and is negative or as stated in the history of present illness.    Social History     Social History     Marital status: Single     Spouse name: N/A     Number of children: N/A     Years of education: N/A     Occupational History     Not on file.     Social History Main Topics     Smoking status: Former Smoker     Smokeless tobacco:  "Not on file     Alcohol use No     Drug use: No     Sexual activity: Yes     Partners: Female      Comment: in relationship      Other Topics Concern     Not on file     Social History Narrative    Works as political organizers.       Active Ambulatory Problems     Diagnosis Date Noted     Major Depression, Single Episode      Anxiety      ADHD, Predominantly Inattentive Type      Insomnia      Deviated Nasal Septum (Acquired)      Hypertrophied Nasal Turbinate      Controlled substance agreement signed 11/16/2015     Resolved Ambulatory Problems     Diagnosis Date Noted     Skin Neoplasm Of Uncertain Behavior      Cellulitis of hand, left 01/04/2017     Cat bite, initial encounter      Past Medical History:   Diagnosis Date     ADD (attention deficit disorder)        Family History   Problem Relation Age of Onset     No Medical Problems Mother      No Medical Problems Father        Objective:     /62 (Patient Site: Left Arm, Patient Position: Sitting, Cuff Size: Adult Large)  Pulse 75  Ht 5' 7\" (1.702 m)  Wt 209 lb 6.4 oz (95 kg)  SpO2 98%  BMI 32.8 kg/m2    Patient is alert, in no obvious distress.   Skin: Warm, dry.    Lungs:  Clear to auscultation. Respirations even and unlabored.  No wheezing or rales noted.   Heart:  Regular rate and rhythm.  No murmurs.  Abdomen: Soft, nontender.  No organomegaly. Bowel sounds normoactive. No guarding or masses noted. .                "

## 2021-06-15 ENCOUNTER — COMMUNICATION - HEALTHEAST (OUTPATIENT)
Dept: FAMILY MEDICINE | Facility: CLINIC | Age: 36
End: 2021-06-15

## 2021-06-15 DIAGNOSIS — F90.2 ATTENTION DEFICIT HYPERACTIVITY DISORDER (ADHD), COMBINED TYPE: ICD-10-CM

## 2021-06-15 RX ORDER — BUPROPION HYDROCHLORIDE 150 MG/1
TABLET ORAL
Qty: 90 TABLET | Refills: 0 | Status: SHIPPED | OUTPATIENT
Start: 2021-06-15 | End: 2022-01-28

## 2021-06-16 PROBLEM — R51.9 HEADACHE: Status: ACTIVE | Noted: 2020-03-04

## 2021-06-16 NOTE — TELEPHONE ENCOUNTER
Left message and my direct number for scheduling with SEMAJ Carcamo.    Lakesha Moseley  [facility]  General Surgery  P: [phone]  F: [phone]

## 2021-06-16 NOTE — PROGRESS NOTES
"Seamus Pereira is 35 y.o.  male who presents for a billable video visit today.    How would you like to obtain your AVS? MyChart.  If dropped from the video visit, the video invitation should be resent by: Send to e-mail at: [email or web address]  Will anyone else be joining your video visit? No      Video Start Time: 11:11am  End time 11:41am    Bariatric Follow-up    35 y.o.  male BMI:Body mass index is 37.31 kg/m .    Weight:   Wt Readings from Last 1 Encounters:   04/02/21 (!) 240 lb (108.9 kg)    pounds  Height: 5' 7.25\" (1.708 m) (4/2/2021 10:00 AM)  Initial Weight: 230 lbs (4/2/2021 10:00 AM)  Weight: (!) 240 lb (108.9 kg) (4/2/2021 10:00 AM)  Weight loss from initial: -10 (4/2/2021 10:00 AM)  % Weight loss: -4.35 % (4/2/2021 10:00 AM)  BMI (Calculated): 37.3 (4/2/2021 10:00 AM)  SpO2: 99 % (8/28/2020  9:45 AM)      Comorbidities:  Patient Active Problem List   Diagnosis     ADHD, Predominantly Inattentive Type     Deviated Nasal Septum (Acquired)     Hypertrophied Nasal Turbinate     Controlled substance agreement signed     Headache     Obesity (BMI 35.0-39.9 without comorbidity)     Dyslipidemia     Anxiety     Migraine         Interim: up 10# from initial visit. Winter and COVID stress have been hard. He feels optimistic now. He is drinking many Monster drinks. Classes finish end of May and work will be less stressful. Taking extra class in the middle of a stressful work period. Eating out more. Staying up late, getting up early, taking a nap    Plan:  DIET  Will work toward 3 meals, less grazing, naltrexone may help evening cravings during night owl times. Limit caffeine in monster drinks to 300mg caffeine daily   EXERCISE get back on the treadmill in the morning if even for 5 minutes   PHARMACOTHERAPY continue phenermine 1tab in the am and add naltrexone prefer dinner but am OK if not able to remember to take it with dinner. 25mg (1/2 tab)    protect your sleep. F/U with Lorna specifically. "      -We reviewed his medications and whether associated with weight gain.  Current Outpatient Medications on File Prior to Visit   Medication Sig Dispense Refill     buPROPion (WELLBUTRIN XL) 150 MG 24 hr tablet Take 1 tablet (150 mg total) by mouth daily. 90 tablet 0     phentermine (ADIPEX-P) 37.5 mg tablet Take 1/2 to 1 tablet in the morning. 90 tablet 1     [DISCONTINUED] nicotine (NICODERM CQ) 21 mg/24 hr Place 1 patch on the skin daily. 30 patch 1     [DISCONTINUED] nicotine (NICODERM CQ) 7 mg/24 hr Place 1 patch on the skin daily. 30 patch 0     No current facility-administered medications on file prior to visit.         We discussed [facility] Bariatric Basics including:  -eating 3 meals daily  -eating protein first  -eating slowly, chewing food well  -avoiding/limiting calorie containing beverages  -choosing wheat, not white with breads, crackers, pastas, chloe, bagels, tortillas, rice  -limiting restaurant or cafeteria eating to twice a week or less  -We discussed the importance of restorative sleep and stress management in maintaining a healthy weight.  -We discussed insulin resistance and glycemic index as it relates to appetite and weight control  -We discussed the National Weight Control Registry healthy weight maintenance strategies and ways to optimize metabolism.  -We discussed the importance of physical activity including cardiovascular and strength training in maintaining a healthier weight and explored viable options.    Most recent labs:  Lab Results   Component Value Date    WBC 8.2 01/04/2017    HGB 13.8 (L) 07/22/2019    HCT 42.1 01/04/2017    MCV 81 01/04/2017     01/04/2017     Lab Results   Component Value Date    CHOL 218 (H) 08/28/2020     Lab Results   Component Value Date    HDL 49 08/28/2020     Lab Results   Component Value Date    LDLCALC 145 (H) 08/28/2020     Lab Results   Component Value Date    TRIG 118 08/28/2020     Lab Results   Component Value Date    HGBA1C 4.5  "08/28/2020     Lab Results   Component Value Date    TSH 3.28 08/28/2020     DIETARY HISTORY  Meals Per Day: 3-grazing really  Eating Protein First?: yes  Food Diary: B:protein bar L:protein bar D:[place]  Snacks Per Day: grazing  Typical Snack: protein bar, cheese tortilla  Fluid Intake: monster drinks.   Portion Control: problematic  Calorie Containing Beverages: no  Alcohol per week: no  Typical Protein Food Choices: variety  Choosing Whole Grains: sometimes  Grocery Shopping is done by: shared  Food Preparation is done by: shared  Meals at Restaurant/Cafeteria/Take Out Per Week: delivery, more lately 4/wk  Eating at the Table: no  TV is Off During Meals: no    Positive Changes Since Last Visit: calorie free drinks  Struggling With: balance    Knowledgeable in Reading Food Labels: yes  Getting Adequate Protein: yes  Sleeping 7-8 hours/day yes with a nap  Stress management     PHYSICAL ACTIVITY PATTERNS:  Cardiovascular: has a treadmill  Strength Training: no    REVIEW OF SYSTEMS  GENERAL/CONSTITUTIONAL:  Fatigue: yes  CARDIOVASCULAR:  Chest Pain with Exertion: no  PULMONARY:  Dyspnea on exertion: yes  CPAP Use:   Tobacco Use:   GASTROINTESTINAL:  GERD/Heartburn:   UROLOGIC:  Urinary Symptoms:   NEUROLOGIC:  Headaches: some  Paresthesias:   PSYCHIATRIC:  Moods: stable/stress is high  MUSCULOSKELETAL/RHEUMATOLOGIC  Arthralgias: yes  Myalgias: no  ENDOCRINE:  Monitoring Blood Sugars: no  Sugars Well Controlled: jackelin  DERMATOLOGIC:  Rashes: no    PHYSICAL EXAM: (most recent vitals and today's stated weight)  Vitals: Ht 5' 7.25\" (1.708 m)   Wt (!) 240 lb (108.9 kg)   BMI 37.31 kg/m    Height: 5' 7.25\" (1.708 m) (4/2/2021 10:00 AM)  Initial Weight: 230 lbs (4/2/2021 10:00 AM)  Weight: (!) 240 lb (108.9 kg) (4/2/2021 10:00 AM)  Weight loss from initial: -10 (4/2/2021 10:00 AM)  % Weight loss: -4.35 % (4/2/2021 10:00 AM)  BMI (Calculated): 37.3 (4/2/2021 10:00 AM)  SpO2: 99 % (8/28/2020  9:45 AM)      GEN: " Pleasant and in no acute distress  PSYCH: A&OX3,     I have reviewed the note as documented above.  This accurately captures the substance of my conversation with the patient.  Thank you for the opportunity to participate in the care of your patient.    Joyce Samuel MD, FAAFP  [facility]  Diplomate, American Board of Obesity Medicine    Total time spent on the date of this encounter doing: chart review, review of test results, patient visit, physical exam, education, counseling, developing plan of care, and documenting = 30 minutes.    Video-Visit Details    Type of service:  Video Visit      Originating Location (pt. Location): Home    Distant Location (provider location):  [facility] [place]     Platform used for Video Visit: [employer]

## 2021-06-16 NOTE — TELEPHONE ENCOUNTER
----- Message from Joyce Samuel MD sent at 4/2/2021 11:42 AM CDT -----  Regarding: f/u eliane  Can you schedule Gus with Eliane mid May? Thanks!

## 2021-06-19 NOTE — PROGRESS NOTES
Assessment and Plan:     1. Attention deficit disorder     2. Anxiety  buPROPion (WELLBUTRIN XL) 300 MG 24 hr tablet   3. Mild single current episode of major depressive disorder (H)  buPROPion (WELLBUTRIN XL) 300 MG 24 hr tablet   4. Obesity       He continues Adderall as prescribed.  He would like to wean off of this in the future.  He will notify me via my chart.  Obtained new controlled substance agreement.  Anticipate urine drug screen at his six-month follow-up.  He continues bupropion for anxiety and depression.  Discussed consuming a healthy diet and exercising.  He is content with the plan.  The following high BMI interventions were performed this visit: dietary needs education, exercise promotion: strength training and exercise promotion: stretching    Subjective:     Seamus is a 32 y.o. male presenting to the clinic for medication management.  Patient is taking Adderall XR 30 mg daily for ADHD.  He is tolerating the medication well and denies any side effects.  Patient feels as though the medication assists with concentration.  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  He has lost 10 pounds.  If he misses a dose, he feels burned out by the end of the day.  He has difficulty concentrating and feels anxious.  Patient does not want to take Adderall long-term, so he is interested in weaning off the Adderall in the future.  He will notify me when he is interested in this.  He does take Wellbutrin for depression and anxiety.  Patient feels as though it is providing assistance.  He feels as though his mood is more relaxed.  He has not smoked for the past year.    Review of Systems: A complete 14 point review of systems was obtained and is negative or as stated in the history of present illness.    Social History     Social History     Marital status: Single     Spouse name: N/A     Number of children: N/A     Years of education:  "N/A     Occupational History     Not on file.     Social History Main Topics     Smoking status: Former Smoker     Smokeless tobacco: Never Used     Alcohol use No     Drug use: No     Sexual activity: Yes     Partners: Female      Comment: in relationship      Other Topics Concern     Not on file     Social History Narrative    Works as political organizers.       Active Ambulatory Problems     Diagnosis Date Noted     Major depressive disorder, single episode      Anxiety      ADHD, Predominantly Inattentive Type      Insomnia      Deviated Nasal Septum (Acquired)      Hypertrophied Nasal Turbinate      Controlled substance agreement signed 11/16/2015     Resolved Ambulatory Problems     Diagnosis Date Noted     Skin Neoplasm Of Uncertain Behavior      Cellulitis of hand, left 01/04/2017     Cat bite, initial encounter      Past Medical History:   Diagnosis Date     ADD (attention deficit disorder)        Family History   Problem Relation Age of Onset     No Medical Problems Mother      No Medical Problems Father        Objective:     /60  Pulse 76  Ht 5' 7\" (1.702 m)  Wt 219 lb 1.6 oz (99.4 kg)  BMI 34.32 kg/m2    Patient is alert, in no obvious distress.   Skin: Warm, dry.    Lungs:  Clear to auscultation. Respirations even and unlabored.  No wheezing or rales noted.   Heart:  Regular rate and rhythm.  No murmurs, S3, S4, gallops, or rubs.    Abdomen: Soft, nontender.  No organomegaly. Bowel sounds normoactive. No guarding or masses noted.                 "

## 2021-06-25 NOTE — TELEPHONE ENCOUNTER
Refill Approved    Rx renewed per Medication Renewal Policy. Medication was last renewed on 12/2/20.    Rubin Guzman, [facility] Triage/Med Refill 6/15/2021     Requested Prescriptions   Pending Prescriptions Disp Refills     buPROPion (WELLBUTRIN XL) 150 MG 24 hr tablet [Pharmacy Med Name: BUPROPION XL 150MG TABLETS (24 H)] 90 tablet 0     Sig: TAKE 1 TABLET(150 MG) BY MOUTH DAILY       Tricyclics/Misc Antidepressant/Antianxiety Meds Refill Protocol Passed - 6/14/2021  1:57 PM        Passed - PCP or prescribing provider visit in last year     Last office visit with prescriber/PCP: Visit date not found OR same dept: Visit date not found OR same specialty: 3/6/2020 Elvira Rodas CNP  Last physical: Visit date not found Last MTM visit: Visit date not found   Next visit within 3 mo: Visit date not found  Next physical within 3 mo: Visit date not found  Prescriber OR PCP: Perez Xiao MD  Last diagnosis associated with med order: 1. Attention deficit hyperactivity disorder (ADHD), combined type  - buPROPion (WELLBUTRIN XL) 150 MG 24 hr tablet [Pharmacy Med Name: BUPROPION XL 150MG TABLETS (24 H)]; TAKE 1 TABLET(150 MG) BY MOUTH DAILY  Dispense: 90 tablet; Refill: 0    If protocol passes may refill for 12 months if within 3 months of last provider visit (or a total of 15 months).

## 2021-06-26 ENCOUNTER — HEALTH MAINTENANCE LETTER (OUTPATIENT)
Age: 36
End: 2021-06-26

## 2021-10-16 ENCOUNTER — HEALTH MAINTENANCE LETTER (OUTPATIENT)
Age: 36
End: 2021-10-16

## 2021-12-13 ENCOUNTER — TRANSFERRED RECORDS (OUTPATIENT)
Dept: HEALTH INFORMATION MANAGEMENT | Facility: CLINIC | Age: 36
End: 2021-12-13

## 2022-01-28 ENCOUNTER — OFFICE VISIT (OUTPATIENT)
Dept: FAMILY MEDICINE | Facility: CLINIC | Age: 37
End: 2022-01-28
Payer: COMMERCIAL

## 2022-01-28 VITALS
BODY MASS INDEX: 36.66 KG/M2 | SYSTOLIC BLOOD PRESSURE: 125 MMHG | DIASTOLIC BLOOD PRESSURE: 74 MMHG | HEART RATE: 72 BPM | OXYGEN SATURATION: 100 % | WEIGHT: 235.8 LBS

## 2022-01-28 DIAGNOSIS — E66.09 CLASS 2 OBESITY DUE TO EXCESS CALORIES WITHOUT SERIOUS COMORBIDITY WITH BODY MASS INDEX (BMI) OF 36.0 TO 36.9 IN ADULT: ICD-10-CM

## 2022-01-28 DIAGNOSIS — F33.0 MILD RECURRENT MAJOR DEPRESSION (H): ICD-10-CM

## 2022-01-28 DIAGNOSIS — F41.9 ANXIETY: ICD-10-CM

## 2022-01-28 DIAGNOSIS — F90.0 ATTENTION DEFICIT HYPERACTIVITY DISORDER (ADHD), PREDOMINANTLY INATTENTIVE TYPE: Primary | ICD-10-CM

## 2022-01-28 DIAGNOSIS — Z79.899 MEDICATION MANAGEMENT: ICD-10-CM

## 2022-01-28 DIAGNOSIS — E66.812 CLASS 2 OBESITY DUE TO EXCESS CALORIES WITHOUT SERIOUS COMORBIDITY WITH BODY MASS INDEX (BMI) OF 36.0 TO 36.9 IN ADULT: ICD-10-CM

## 2022-01-28 DIAGNOSIS — Z23 HIGH PRIORITY FOR 2019-NCOV VACCINE: ICD-10-CM

## 2022-01-28 PROBLEM — E66.01 MORBID OBESITY (H): Status: RESOLVED | Noted: 2022-01-28 | Resolved: 2022-01-28

## 2022-01-28 PROBLEM — E66.01 MORBID OBESITY (H): Status: ACTIVE | Noted: 2022-01-28

## 2022-01-28 PROCEDURE — 90686 IIV4 VACC NO PRSV 0.5 ML IM: CPT | Performed by: NURSE PRACTITIONER

## 2022-01-28 PROCEDURE — 90471 IMMUNIZATION ADMIN: CPT | Performed by: NURSE PRACTITIONER

## 2022-01-28 PROCEDURE — 91306 COVID-19,PF,MODERNA (18+ YRS BOOSTER .25ML): CPT | Performed by: NURSE PRACTITIONER

## 2022-01-28 PROCEDURE — 0064A COVID-19,PF,MODERNA (18+ YRS BOOSTER .25ML): CPT | Performed by: NURSE PRACTITIONER

## 2022-01-28 PROCEDURE — 99214 OFFICE O/P EST MOD 30 MIN: CPT | Mod: 25 | Performed by: NURSE PRACTITIONER

## 2022-01-28 RX ORDER — BUPROPION HYDROCHLORIDE 300 MG/1
300 TABLET ORAL EVERY MORNING
Qty: 90 TABLET | Refills: 3 | Status: SHIPPED | OUTPATIENT
Start: 2022-01-28 | End: 2023-03-27

## 2022-01-28 NOTE — PROGRESS NOTES
Assessment and Plan:       ICD-10-CM    1. Attention deficit hyperactivity disorder (ADHD), predominantly inattentive type  F90.0 buPROPion (WELLBUTRIN XL) 300 MG 24 hr tablet   2. Mild recurrent major depression (H)  F33.0 buPROPion (WELLBUTRIN XL) 300 MG 24 hr tablet   3. Anxiety  F41.9 buPROPion (WELLBUTRIN XL) 300 MG 24 hr tablet   4. Class 2 obesity due to excess calories without serious comorbidity with body mass index (BMI) of 36.0 to 36.9 in adult  E66.09     Z68.36    5. High priority for 2019-nCoV vaccine  Z23 COVID-19,PF,MODERNA (18+ Yrs BOOSTER .25mL)   6. Medication management  Z79.899 Basic metabolic panel  (Ca, Cl, CO2, Creat, Gluc, K, Na, BUN)     Patient feels as though his mood is stable.  He continues bupropion as prescribed.  He continues to see therapy.  He is not interested in continuing treatment with a psychiatrist.  He will continue his weight loss efforts.  Provided Moderna booster and influenza vaccine.  I encouraged him to follow-up for a fasting physical.  He is content with the plan.    Subjective:     Seamus is a 36 year old male presenting to the clinic for medication management.  Patient has been seeing a therapist for anxiety, depression, and ADHD.  He was referred to a psychiatrist in which he was prescribed bupropion  mg daily.  Patient is tolerating the medication well without any side effects.  Patient is interested in having primary care refill this medication.  Patient  from his significant other recently.  She has bipolar disorder and borderline personality disorder.  She moved out and was living with her sister.  Patient has been caring for his 7-year-old daughter.  He denies thoughts of suicide.  He is working with in  for a union group.  Patient feels as though his mood is stable.    Reviewof Systems: A complete 14 point review of systems was obtained and is negative or as stated in the history of present illness.    Social History      Socioeconomic History     Marital status: Single     Spouse name: Not on file     Number of children: Not on file     Years of education: Not on file     Highest education level: Not on file   Occupational History     Not on file   Tobacco Use     Smoking status: Former Smoker     Smokeless tobacco: Never Used     Tobacco comment: age 18 to 35 with intermittent quitting   Substance and Sexual Activity     Alcohol use: No     Drug use: No     Sexual activity: Yes     Partners: Female     Comment: in relationship    Other Topics Concern     Not on file   Social History Narrative    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   In a Master's program for Advocacy in Political Leaders     Social Determinants of Health     Financial Resource Strain: Not on file   Food Insecurity: Not on file   Transportation Needs: Not on file   Physical Activity: Not on file   Stress: Not on file   Social Connections: Not on file   Intimate Partner Violence: Not on file   Housing Stability: Not on file       Active Ambulatory Problems     Diagnosis Date Noted     ADHD, Predominantly Inattentive Type      Deviated Nasal Septum (Acquired)      Hypertrophied Nasal Turbinate      Controlled substance agreement signed 11/16/2015     Headache 03/04/2020     Obesity (BMI 35.0-39.9 without comorbidity)      Dyslipidemia      Anxiety      Migraine      Resolved Ambulatory Problems     Diagnosis Date Noted     Major depressive disorder, single episode      Past Medical History:   Diagnosis Date     ADD (attention deficit disorder)      ADHD      Joint pain        Family History   Problem Relation Age of Onset     No Known Problems Mother      No Known Problems Father         circulation issues     Cancer Brother         angiosarcoma       Objective:     /74 (BP Location: Right arm, Patient Position: Sitting, Cuff Size: Adult Large)   Pulse 72   Wt 107 kg (235 lb 12.8 oz)   SpO2 100%   BMI 36.66 kg/m      Patient  is alert, in no obvious distress.   Skin: Warm, dry.    Lungs:  Clear to auscultation. Respirations even and unlabored.  No wheezing or rales noted.   Heart:  Regular rate and rhythm.  No murmurs, S3, S4, gallops, or rubs.    Abdomen: Soft, nontender.  No organomegaly. Bowel sounds normoactive. No guarding or masses noted.

## 2022-06-07 ENCOUNTER — TRANSFERRED RECORDS (OUTPATIENT)
Dept: HEALTH INFORMATION MANAGEMENT | Facility: CLINIC | Age: 37
End: 2022-06-07
Payer: COMMERCIAL

## 2022-09-25 ENCOUNTER — HEALTH MAINTENANCE LETTER (OUTPATIENT)
Age: 37
End: 2022-09-25

## 2023-01-09 ENCOUNTER — TRANSFERRED RECORDS (OUTPATIENT)
Dept: HEALTH INFORMATION MANAGEMENT | Facility: CLINIC | Age: 38
End: 2023-01-09

## 2023-01-14 ENCOUNTER — TRANSFERRED RECORDS (OUTPATIENT)
Dept: HEALTH INFORMATION MANAGEMENT | Facility: CLINIC | Age: 38
End: 2023-01-14

## 2023-02-04 ENCOUNTER — HEALTH MAINTENANCE LETTER (OUTPATIENT)
Age: 38
End: 2023-02-04

## 2023-02-20 ENCOUNTER — OFFICE VISIT (OUTPATIENT)
Dept: FAMILY MEDICINE | Facility: CLINIC | Age: 38
End: 2023-02-20
Payer: COMMERCIAL

## 2023-02-20 VITALS
SYSTOLIC BLOOD PRESSURE: 118 MMHG | WEIGHT: 232 LBS | BODY MASS INDEX: 35.16 KG/M2 | HEART RATE: 70 BPM | DIASTOLIC BLOOD PRESSURE: 82 MMHG | RESPIRATION RATE: 16 BRPM | HEIGHT: 68 IN | OXYGEN SATURATION: 97 % | TEMPERATURE: 97.7 F

## 2023-02-20 DIAGNOSIS — G56.20 CUBITAL TUNNEL SYNDROME, UNSPECIFIED LATERALITY: ICD-10-CM

## 2023-02-20 DIAGNOSIS — Z01.818 PREOP GENERAL PHYSICAL EXAM: Primary | ICD-10-CM

## 2023-02-20 PROCEDURE — 90715 TDAP VACCINE 7 YRS/> IM: CPT | Performed by: FAMILY MEDICINE

## 2023-02-20 PROCEDURE — 99214 OFFICE O/P EST MOD 30 MIN: CPT | Mod: 25 | Performed by: FAMILY MEDICINE

## 2023-02-20 PROCEDURE — 90471 IMMUNIZATION ADMIN: CPT | Performed by: FAMILY MEDICINE

## 2023-02-20 ASSESSMENT — PAIN SCALES - GENERAL: PAINLEVEL: EXTREME PAIN (9)

## 2023-02-20 ASSESSMENT — PATIENT HEALTH QUESTIONNAIRE - PHQ9
SUM OF ALL RESPONSES TO PHQ QUESTIONS 1-9: 4
SUM OF ALL RESPONSES TO PHQ QUESTIONS 1-9: 4
10. IF YOU CHECKED OFF ANY PROBLEMS, HOW DIFFICULT HAVE THESE PROBLEMS MADE IT FOR YOU TO DO YOUR WORK, TAKE CARE OF THINGS AT HOME, OR GET ALONG WITH OTHER PEOPLE: NOT DIFFICULT AT ALL

## 2023-02-20 NOTE — PROGRESS NOTES
[facility]  [address]  [place] [address]  Phone: [phone]  Fax: [phone]  Primary Provider: Elvira Rodas  Pre-op Performing Provider: ARCHIE FALL       PREOPERATIVE EVALUATION:  Today's date: 2/20/2023    Seamus Pereira is a 37 year old male who presents for a preoperative evaluation.    Surgical Information:  Surgery/Procedure: left arm cubital tunnel release    Surgery Location: [facility] [place]   Surgeon: Dr Elia Silva   Surgery Date: 3/9/23   Time of Surgery: ?  Where patient plans to recover: At home with family  Fax number for surgical facility: [phone]     Type of Anesthesia Anticipated: to be determined    Assessment & Plan     The proposed surgical procedure is considered LOW risk.    Problem List Items Addressed This Visit    None  Visit Diagnoses     Preop general physical exam    -  Primary    Cubital tunnel syndrome, unspecified laterality                     Risks and Recommendations:  The patient has the following additional risks and recommendations for perioperative complications:   - No identified additional risk factors other than previously addressed        RECOMMENDATION:  APPROVAL GIVEN to proceed with proposed procedure, without further diagnostic evaluation.     [id number]}    Subjective     HPI related to upcoming procedure:      Preop Questions 2/20/2023   1. Have you ever had a heart attack or stroke? No   2. Have you ever had surgery on your heart or blood vessels, such as a stent placement, a coronary artery bypass, or surgery on an artery in your head, neck, heart, or legs? No   3. Do you have chest pain with activity? No   4. Do you have a history of  heart failure? No   5. Do you currently have a cold, bronchitis or symptoms of other infection? No   6. Do you have a cough, shortness of breath, or wheezing? No   7. Do you or anyone in your family have previous history of  blood clots? No   8. Do you or does anyone in your family have a serious bleeding problem such as prolonged bleeding following surgeries or cuts? No   9. Have you ever had problems with anemia or been told to take iron pills? No   10. Have you had any abnormal blood loss such as black, tarry or bloody stools? No   11. Have you ever had a blood transfusion? No   12. Are you willing to have a blood transfusion if it is medically needed before, during, or after your surgery? Yes   13. Have you or any of your relatives ever had problems with anesthesia? No   14. Do you have sleep apnea, excessive snoring or daytime drowsiness? No   15. Do you have any artifical heart valves or other implanted medical devices like a pacemaker, defibrillator, or continuous glucose monitor? No   16. Do you have artificial joints? No   17. Are you allergic to latex? No        5     Review of Systems  CONSTITUTIONAL: NEGATIVE for fever, chills, change in weight  ENT/MOUTH: NEGATIVE for ear, mouth and throat problems  RESP: NEGATIVE for significant cough or SOB  CV: NEGATIVE for chest pain, palpitations or peripheral edema    Patient Active Problem List    Diagnosis Date Noted     Obesity (BMI 35.0-39.9 without comorbidity)      Priority: Medium     Dyslipidemia      Priority: Medium     Anxiety      Priority: Medium     Migraine      Priority: Medium     Headache 03/04/2020     Priority: Medium     ADHD, Predominantly Inattentive Type      Priority: Medium     Created by Conversion  Replacement Utility updated for latest IMO load         Controlled substance agreement signed 11/16/2015     Priority: Medium     Deviated Nasal Septum (Acquired)      Priority: Medium     Created by Conversion         Hypertrophied Nasal Turbinate      Priority: Medium     Created by Conversion          Past Medical History:   Diagnosis Date     ADD (attention deficit disorder)      ADHD      Anxiety      Dyslipidemia      Joint pain     ankles, knee, back      "Obesity (BMI 35.0-39.9 without comorbidity)      Past Surgical History:   Procedure Laterality Date     ARTHROSCOPY KNEE Right      HC REMOVE TONSILS/ADENOIDS,12+ Y/O      Description: Tonsillectomy With Adenoidectomy Over Age 12;  Recorded: 02/15/2008;     Current Outpatient Medications   Medication Sig Dispense Refill     buPROPion (WELLBUTRIN XL) 300 MG 24 hr tablet Take 1 tablet (300 mg) by mouth every morning 90 tablet 3       Allergies   Allergen Reactions     Amoxapine Unknown     Patient unsure of this allergy     Amoxicillin Unknown     Unknown reaction as a baby, Pt states unsure of reaction-childhood         Social History     Tobacco Use     Smoking status: Former     Smokeless tobacco: Never     Tobacco comments:     age 18 to 35 with intermittent quitting   Substance Use Topics     Alcohol use: No       History   Drug Use No         Objective     /82   Pulse 70   Temp 97.7  F (36.5  C)   Resp 16   Ht 1.715 m (5' 7.5\")   Wt 105.2 kg (232 lb)   SpO2 97%   BMI 35.80 kg/m      Physical Exam    GENERAL APPEARANCE: healthy, alert and no distress     EYES: EOMI,  PERRL     HENT: ear canals and TM's normal and nose and mouth without ulcers or lesions     NECK: no adenopathy, no asymmetry, masses, or scars and thyroid normal to palpation     RESP: lungs clear to auscultation - no rales, rhonchi or wheezes     CV: regular rates and rhythm, normal S1 S2, no S3 or S4 and no murmur, click or rub     ABDOMEN:  soft, nontender, no HSM or masses and bowel sounds normal     MS: extremities normal- no gross deformities noted, no evidence of inflammation in joints, FROM in all extremities.     SKIN: no suspicious lesions or rashes     NEURO: Normal strength and tone, sensory exam grossly normal, mentation intact and speech normal     PSYCH: mentation appears normal. and affect normal/bright     LYMPHATICS: No cervical adenopathy    Labs:  None indicated    Revised Cardiac Risk Index (RCRI):  The patient has " the following serious cardiovascular risks for perioperative complications:   - No serious cardiac risks = 0 points     RCRI Interpretation: 0 points: Class I (very low risk - 0.4% complication rate)           Signed Electronically by: ARCHIE FALL MD  Copy of this evaluation report is provided to requesting physician.      Answers for HPI/ROS submitted by the patient on 2/20/2023  If you checked off any problems, how difficult have these problems made it for you to do your work, take care of things at home, or get along with other people?: Not difficult at all  PHQ9 TOTAL SCORE: 4

## 2023-02-27 ENCOUNTER — VIRTUAL VISIT (OUTPATIENT)
Dept: FAMILY MEDICINE | Facility: CLINIC | Age: 38
End: 2023-02-27
Payer: COMMERCIAL

## 2023-02-27 DIAGNOSIS — F90.0 ATTENTION DEFICIT HYPERACTIVITY DISORDER (ADHD), PREDOMINANTLY INATTENTIVE TYPE: Primary | ICD-10-CM

## 2023-02-27 DIAGNOSIS — F41.9 ANXIETY: ICD-10-CM

## 2023-02-27 PROCEDURE — 99214 OFFICE O/P EST MOD 30 MIN: CPT | Mod: VID | Performed by: NURSE PRACTITIONER

## 2023-02-27 RX ORDER — DEXTROAMPHETAMINE SACCHARATE, AMPHETAMINE ASPARTATE MONOHYDRATE, DEXTROAMPHETAMINE SULFATE AND AMPHETAMINE SULFATE 5; 5; 5; 5 MG/1; MG/1; MG/1; MG/1
20 CAPSULE, EXTENDED RELEASE ORAL DAILY
Qty: 30 CAPSULE | Refills: 0 | Status: SHIPPED | OUTPATIENT
Start: 2023-02-27 | End: 2023-03-27

## 2023-02-27 ASSESSMENT — ANXIETY QUESTIONNAIRES
1. FEELING NERVOUS, ANXIOUS, OR ON EDGE: SEVERAL DAYS
2. NOT BEING ABLE TO STOP OR CONTROL WORRYING: SEVERAL DAYS
GAD7 TOTAL SCORE: 6
5. BEING SO RESTLESS THAT IT IS HARD TO SIT STILL: SEVERAL DAYS
IF YOU CHECKED OFF ANY PROBLEMS ON THIS QUESTIONNAIRE, HOW DIFFICULT HAVE THESE PROBLEMS MADE IT FOR YOU TO DO YOUR WORK, TAKE CARE OF THINGS AT HOME, OR GET ALONG WITH OTHER PEOPLE: SOMEWHAT DIFFICULT
GAD7 TOTAL SCORE: 6
4. TROUBLE RELAXING: SEVERAL DAYS
3. WORRYING TOO MUCH ABOUT DIFFERENT THINGS: SEVERAL DAYS
7. FEELING AFRAID AS IF SOMETHING AWFUL MIGHT HAPPEN: NOT AT ALL
7. FEELING AFRAID AS IF SOMETHING AWFUL MIGHT HAPPEN: NOT AT ALL
6. BECOMING EASILY ANNOYED OR IRRITABLE: SEVERAL DAYS
8. IF YOU CHECKED OFF ANY PROBLEMS, HOW DIFFICULT HAVE THESE MADE IT FOR YOU TO DO YOUR WORK, TAKE CARE OF THINGS AT HOME, OR GET ALONG WITH OTHER PEOPLE?: SOMEWHAT DIFFICULT

## 2023-02-27 ASSESSMENT — PATIENT HEALTH QUESTIONNAIRE - PHQ9
SUM OF ALL RESPONSES TO PHQ QUESTIONS 1-9: 4
10. IF YOU CHECKED OFF ANY PROBLEMS, HOW DIFFICULT HAVE THESE PROBLEMS MADE IT FOR YOU TO DO YOUR WORK, TAKE CARE OF THINGS AT HOME, OR GET ALONG WITH OTHER PEOPLE: SOMEWHAT DIFFICULT
SUM OF ALL RESPONSES TO PHQ QUESTIONS 1-9: 4

## 2023-02-27 NOTE — PROGRESS NOTES
Gus is a 37 year old who is being evaluated via a billable video visit.      How would you like to obtain your AVS? MyChart  If the video visit is dropped, the invitation should be resent by: Text to cell phone: [phone]  Will anyone else be joining your video visit? No          Attention deficit hyperactivity disorder (ADHD), predominantly inattentive type  This is uncontrolled.  Discussed treatment options.  We will restart Adderall XR 20 mg daily.  Educated on its indications and side effects.  He is to follow-up in 1 month for controlled substance agreement and urine drug screen.  I reviewed the PDMP and there are no concerns today.  - amphetamine-dextroamphetamine (ADDERALL XR) 20 MG 24 hr capsule  Dispense: 30 capsule; Refill: 0    Anxiety  Patient continues bupropion.  This is controlled.        Subjective   Gus is a 37 year old presenting for the following health issues:  establish care med check - restart adderall  Patient presents today with concerns for ADHD symptoms.  Patient was diagnosed with ADHD in his 20s.  He took Adderall at that time.  Patient has been without Adderall for multiple years.  He is taking bupropion  mg daily for anxiety.  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  His responsibilities have increased as he is working through legislative sessions and union strikes.  He has had difficulty completing tasks in a timely manner and has been making mistakes at his job. He denies thoughts of suicide.   History of Present Illness       Reason for visit:  Resume ADHD medication    He eats 2-3 servings of fruits and vegetables daily.He consumes 0 sweetened beverage(s) daily.He exercises with enough effort to increase his heart rate 10 to 19 minutes per day.  He exercises with enough effort to increase his heart rate 4 days per week.   He is taking medications regularly.    Today's PHQ-9         PHQ-9 Total Score: 4    PHQ-9 Q9 Thoughts of better off dead/self-harm past 2  weeks :   Not at all    How difficult have these problems made it for you to do your work, take care of things at home, or get along with other people: Somewhat difficult  Today's ABRAM-7 Score: 6       Review of Systems   Constitutional, HEENT, cardiovascular, pulmonary, gi and gu systems are negative, except as otherwise noted.      Objective           Vitals:  No vitals were obtained today due to virtual visit.    Physical Exam   GENERAL: Healthy, alert and no distress  EYES: Eyes grossly normal to inspection.  No discharge or erythema, or obvious scleral/conjunctival abnormalities.  HENT: Normal cephalic/atraumatic.  External ears, nose and mouth without ulcers or lesions.  No nasal drainage visible.  NECK: No asymmetry, visible masses or scars  RESP: No audible wheeze, cough, or visible cyanosis.  No visible retractions or increased work of breathing.    MS: No gross musculoskeletal defects noted.  Normal range of motion.  No visible edema.  SKIN: Visible skin clear. No significant rash, abnormal pigmentation or lesions.  NEURO: Cranial nerves grossly intact.  Mentation and speech appropriate for age.  PSYCH: Mentation appears normal, affect normal/bright, judgement and insight intact, normal speech and appearance well-groomed.          Video-Visit Details    Type of service:  Video Visit     Originating Location (pt. Location): Home    Distant Location (provider location):  On-site  Platform used for Video Visit: Scotty

## 2023-03-07 NOTE — PROGRESS NOTES
Pre op faxed   Answers for HPI/ROS submitted by the patient on 2/20/2023  If you checked off any problems, how difficult have these problems made it for you to do your work, take care of things at home, or get along with other people?: Not difficult at all  PHQ9 TOTAL SCORE: 4

## 2023-03-14 ENCOUNTER — MYC MEDICAL ADVICE (OUTPATIENT)
Dept: FAMILY MEDICINE | Facility: CLINIC | Age: 38
End: 2023-03-14
Payer: COMMERCIAL

## 2023-03-14 ENCOUNTER — NURSE TRIAGE (OUTPATIENT)
Dept: FAMILY MEDICINE | Facility: CLINIC | Age: 38
End: 2023-03-14
Payer: COMMERCIAL

## 2023-03-14 DIAGNOSIS — U07.1 INFECTION DUE TO 2019 NOVEL CORONAVIRUS: Primary | ICD-10-CM

## 2023-03-14 NOTE — TELEPHONE ENCOUNTER
"Nurse Triage SBAR    Is this a 2nd Level Triage? NO    Situation:   Evitit message from patient:   \"\"Fox Felix, I tested positive for COVID after being exposed to a friend who later tested positive himself.      I have a sore throat and I think it is impacting my cognitive functions. I seem to have brain fog and I am tired. I have no other symptoms that I am aware of...\"    Background:   Risk factors: former smoker, obesity, anxiety  GFR WNL and No LFT last 6 months     [place]- Instruct patient to continue taking buproprion as directed but know that it may have less effect while taking Paxlovid.    Assessment:   see assessment questions below     Protocol Recommended Disposition:   Discuss With PCP And Callback By Nurse Within 1 Hour    Recommendation:   Continue with Paxlovid treatment per RN Protocol  Patient agreed with plan - Rx sent to pharmacy      Does the patient meet one of the following criteria for ADS visit consideration? 16+ years old, with an [facility] PCP     TIP  Providers, please consider if this condition is appropriate for management at one of our Acute and Diagnostic Services sites.     If patient is a good candidate, please use dotphrase <dot>triageresponse and select Refer to ADS to document.      RN COVID TREATMENT VISIT  03/14/23      The patient has been triaged and does not require a higher level of care.    Seamus Pereira  37 year old  Current weight? 230lbs    Has the patient been seen by a primary care provider at an [facility] or [facility] within the past two years? Yes.   Have you been in close proximity to/do you have a known exposure to a person with a confirmed case of influenza? No.     General treatment eligibility:  Date of positive COVID test (PCR or at home)?  3/12/23    Are you or have you been hospitalized for this COVID-19 infection? No.   Have you received monoclonal antibodies or antiviral treatment for COVID-19 since this positive test? " No.   Do you have any of the following conditions that place you at risk of being very sick from COVID-19?   - Mental health disorders including mood disorders, depression, schizophrenia spectrum disorders   - Overweight or Obesity (BMI >85th percentile or BMI 25 or higher)  - Current or former smoker  Yes, patient has at least one high risk condition as noted above.     Current COVID symptoms:   - cough  - fatigue  - sore throat  Yes. Patient has at least one symptom as selected.     How many days since symptoms started? 5 days or less. Established patient, 12 years or older weighing at least 88.2 lbs, who has symptoms that started in the past 5 days, has not been hospitalized nor received treatment already, and is at risk for being very sick from COVID-19.     Treatment eligibility by RN:    Are you currently pregnant or nursing? No    Do you have a clinically significant hypersensitivity to nirmatrelvir or ritonavir, or toxic epidermal necrolysis (TEN) or Guzman-Akin Syndrome? No    Do you have a history of hepatitis, any hepatic impairment on the Problem List (such as Child-Hernandez Class C, cirrhosis, fatty liver disease, alcoholic liver disease), or was the last liver lab (hepatic panel, ALT, AST, ALK Phos, bilirubin) elevated in the past 6 months? No    Do you have any history of severe renal impairment (eGFR < 30mL/min)? No    Is patient eligible to continue?   Yes, patient meets all eligibility requirements for the RN COVID treatment (as denoted by all no responses above).     Current Outpatient Medications   Medication Sig Dispense Refill     amphetamine-dextroamphetamine (ADDERALL XR) 20 MG 24 hr capsule Take 1 capsule (20 mg) by mouth daily 30 capsule 0     buPROPion (WELLBUTRIN XL) 300 MG 24 hr tablet Take 1 tablet (300 mg) by mouth every morning 90 tablet 3       Medications from List 1 of the standing order (on medications that exclude the use of Paxlovid) that patient is taking: NONE. Is patient  taking Britt's Wort? No  Is patient taking [place]'s Wort or any meds from List 1? No.   Medications from List 2 of the standing order (on meds that provider needs to adjust) that patient is taking: NONE. Is patient on any of the meds from List 2? No.   Medications from List 3 of standing order (on meds that a RN needs to adjust) that patient is taking: bupropion (Wellbutrin, Zyban): Instructed patient to continue taking buproprion as directed but know that it may have less effect while taking Paxlovid.  Is patient on any meds from List 3? Yes. Patient is on meds from list 3. No meds require a provider visit and at least one med required RN to adjust.     Paxlovid has an approximate 90% reduction in hospitalization. Paxlovid can possibly cause altered sense of taste, diarrhea (loose, watery stools), high blood pressure, muscle aches.     Would patient like a Paxlovid prescription?   Yes.   Lab Results   Component Value Date    GFRESTIMATED >60 03/05/2020       Was last eGFR reduced? No, eGFR 60 or greater/ No Result on record. Patient can receive the normal renal function dose. Paxlovid Rx sent to [facility]   [facility]    Temporary change to home medications: None    All medication adjustments (holds, etc) were discussed with the patient and patient was asked to repeat back (teachback) their med adjustment.  Did patient understand med adjustment? Yes, patient repeated back and understood correctly.        Reviewed the following instructions with the patient:    Paxlovid (nimatrelvir and ritonavir)    How it works  Two medicines (nirmatrelvir and ritonavir) are taken together. They stop the virus from growing. Less amount of virus is easier for your body to fight.    How to take    Medicine comes in a daily container with both medicine tablets. Take by mouth twice daily (once in the morning, once at night) for 5 days.    The number of tablets to take varies by patient.    Don't chew or break  capsules. Swallow whole.    When to take  Take as soon as possible after positive COVID-19 test result, and within 5 days of your first symptoms.    Possible side effects  Can cause altered sense of taste, diarrhea (loose, watery stools), high blood pressure, muscle aches.    Gifty Nguyen RN      Reason for Disposition    [1] HIGH RISK for severe COVID complications (e.g., weak immune system, age > 64 years, obesity with BMI of 30 or higher, pregnant, chronic lung disease or other chronic medical condition) AND [2] COVID symptoms (e.g., cough, fever)  (Exceptions: Already seen by PCP and no new or worsening symptoms.)    Additional Information    Negative: SEVERE difficulty breathing (e.g., struggling for each breath, speaks in single words)    Negative: Difficult to awaken or acting confused (e.g., disoriented, slurred speech)    Negative: Bluish (or gray) lips or face now    Negative: Shock suspected (e.g., cold/pale/clammy skin, too weak to stand, low BP, rapid pulse)    Negative: Sounds like a life-threatening emergency to the triager    Negative: [1] Diagnosed or suspected COVID-19 AND [2] symptoms lasting 3 or more weeks    Negative: [1] COVID-19 exposure AND [2] no symptoms    Negative: COVID-19 vaccine reaction suspected (e.g., fever, headache, muscle aches) occurring 1 to 3 days after getting vaccine    Negative: COVID-19 vaccine, questions about    Negative: [1] Lives with someone known to have influenza (flu test positive) AND [2] flu-like symptoms (e.g., cough, runny nose, sore throat, SOB; with or without fever)    Negative: [1] Adult with possible COVID-19 symptoms AND [2] triager concerned about severity of symptoms or other causes    Negative: COVID-19 and breastfeeding, questions about    Negative: SEVERE or constant chest pain or pressure  (Exception: Mild central chest pain, present only when coughing.)    Negative: MODERATE difficulty breathing (e.g., speaks in phrases, SOB even at rest, pulse  "100-120)    Negative: Headache and stiff neck (can't touch chin to chest)    Negative: Oxygen level (e.g., pulse oximetry) 90 percent or lower    Negative: Chest pain or pressure  (Exception: MILD central chest pain, present only when coughing)    Negative: Patient sounds very sick or weak to the triager    Negative: MILD difficulty breathing (e.g., minimal/no SOB at rest, SOB with walking, pulse <100)    Negative: Fever > 103 F (39.4 C)    Negative: [1] Fever > 101 F (38.3 C) AND [2] over 60 years of age    Negative: [1] Fever > 100.0 F (37.8 C) AND [2] bedridden (e.g., nursing home patient, CVA, chronic illness, recovering from surgery)    Answer Assessment - Initial Assessment Questions  1. COVID-19 DIAGNOSIS: \"Who made your COVID-19 diagnosis?\" \"Was it confirmed by a positive lab test or self-test?\" If not diagnosed by a doctor (or NP/PA), ask \"Are there lots of cases (community spread) where you live?\" Note: See [facility] health department website, if unsure.      Positive test 3/12/23  2. COVID-19 EXPOSURE: \"Was there any known exposure to COVID before the symptoms began?\" CDC Definition of close contact: within 6 feet (2 meters) for a total of 15 minutes or more over a 24-hour period.       Yes, concert   3. ONSET: \"When did the COVID-19 symptoms start?\"       3/10/23  4. WORST SYMPTOM: \"What is your worst symptom?\" (e.g., cough, fever, shortness of breath, muscle aches)      Brain fog  5. COUGH: \"Do you have a cough?\" If Yes, ask: \"How bad is the cough?\"        Yes - dry now, was productive over weekend   6. FEVER: \"Do you have a fever?\" If Yes, ask: \"What is your temperature, how was it measured, and when did it start?\"      No  7. RESPIRATORY STATUS: \"Describe your breathing?\" (e.g., shortness of breath, wheezing, unable to speak)       Sinus clogged - hard to breath through nose   Denies SOB   8. BETTER-SAME-WORSE: \"Are you getting better, staying the same or getting worse compared to yesterday?\"  If getting " "worse, ask, \"In what way?\"      Same  9. HIGH RISK DISEASE: \"Do you have any chronic medical problems?\" (e.g., asthma, heart or lung disease, weak immune system, obesity, etc.)      Former smoker, Obesity,  Anxiety   10. VACCINE: \"Have you had the COVID-19 vaccine?\" If Yes, ask: \"Which one, how many shots, when did you get it?\"        Yes   11. BOOSTER: \"Have you received your COVID-19 booster?\" If Yes, ask: \"Which one and when did you get it?\"        Yes   12. PREGNANCY: \"Is there any chance you are pregnant?\" \"When was your last menstrual period?\"        No  13. OTHER SYMPTOMS: \"Do you have any other symptoms?\"  (e.g., chills, fatigue, headache, loss of smell or taste, muscle pain, sore throat)        Sore throat,   14. O2 SATURATION MONITOR:  \"Do you use an oxygen saturation monitor (pulse oximeter) at home?\" If Yes, ask \"What is your reading (oxygen level) today?\" \"What is your usual oxygen saturation reading?\" (e.g., 95%)        No    Protocols used: CORONAVIRUS (COVID-19) DIAGNOSED OR [id number] Grant, RN  [facility]    "

## 2023-03-14 NOTE — TELEPHONE ENCOUNTER
"Nurse Triage SBAR    Situation:   From [facility] message today:  \"Hi Elvira, I tested positive for COVID after being exposed to a friend who later tested positive himself.      I have a sore throat and I think it is impacting my cognitive functions. I seem to have brain fog and I am tired. I have no other symptoms that I am aware of...\"    Background:   Age 37  ADD  ADHD  Anxiety  Former smoker  Obesity- Weight 105.2 kg (232 lb) with BMI 35.80 kg/m  from OV 2/20/23    GFR >60 on 3/5/2020  No liver tests on file.    [place]- Instruct patient to continue taking buproprion as directed but know that it may have less effect while taking Paxlovid.      Assessment:   Fever or chills  Cough  Shortness of breath or difficulty breathing  Fatigue  Muscle or body aches  Headache  New loss of taste or smell  Sore throat  Congestion or runny nose  Nausea or vomiting  Diarrhea    Recommendation:   Left message to return call. Please route to RN queue to triage.    JO-ANN StraussN, RN  [facility]    " yes

## 2023-03-20 ENCOUNTER — TRANSFERRED RECORDS (OUTPATIENT)
Dept: HEALTH INFORMATION MANAGEMENT | Facility: CLINIC | Age: 38
End: 2023-03-20

## 2023-03-27 ENCOUNTER — OFFICE VISIT (OUTPATIENT)
Dept: FAMILY MEDICINE | Facility: CLINIC | Age: 38
End: 2023-03-27
Payer: COMMERCIAL

## 2023-03-27 VITALS
HEART RATE: 75 BPM | DIASTOLIC BLOOD PRESSURE: 82 MMHG | RESPIRATION RATE: 16 BRPM | BODY MASS INDEX: 35.14 KG/M2 | WEIGHT: 227.7 LBS | OXYGEN SATURATION: 98 % | SYSTOLIC BLOOD PRESSURE: 137 MMHG | TEMPERATURE: 98.3 F

## 2023-03-27 DIAGNOSIS — F41.9 ANXIETY: ICD-10-CM

## 2023-03-27 DIAGNOSIS — Z79.899 MEDICATION MANAGEMENT: ICD-10-CM

## 2023-03-27 DIAGNOSIS — Z79.899 MEDICATION MANAGEMENT: Primary | ICD-10-CM

## 2023-03-27 DIAGNOSIS — F90.0 ATTENTION DEFICIT HYPERACTIVITY DISORDER (ADHD), PREDOMINANTLY INATTENTIVE TYPE: ICD-10-CM

## 2023-03-27 DIAGNOSIS — F33.0 MILD RECURRENT MAJOR DEPRESSION (H): ICD-10-CM

## 2023-03-27 LAB
AMPHETAMINES UR QL SCN: ABNORMAL
BARBITURATES UR QL SCN: ABNORMAL
BENZODIAZ UR QL SCN: ABNORMAL
BZE UR QL SCN: ABNORMAL
CANNABINOIDS UR QL SCN: ABNORMAL
CREAT UR-MCNC: 134 MG/DL
OPIATES UR QL SCN: ABNORMAL
OXYCODONE UR QL: ABNORMAL
PCP QUAL URINE (ROCHE): ABNORMAL

## 2023-03-27 PROCEDURE — 80307 DRUG TEST PRSMV CHEM ANLYZR: CPT | Performed by: NURSE PRACTITIONER

## 2023-03-27 PROCEDURE — 99213 OFFICE O/P EST LOW 20 MIN: CPT | Performed by: NURSE PRACTITIONER

## 2023-03-27 RX ORDER — BUPROPION HYDROCHLORIDE 300 MG/1
300 TABLET ORAL EVERY MORNING
Qty: 90 TABLET | Refills: 3 | Status: SHIPPED | OUTPATIENT
Start: 2023-03-27 | End: 2024-06-11

## 2023-03-27 RX ORDER — DEXTROAMPHETAMINE SACCHARATE, AMPHETAMINE ASPARTATE MONOHYDRATE, DEXTROAMPHETAMINE SULFATE AND AMPHETAMINE SULFATE 5; 5; 5; 5 MG/1; MG/1; MG/1; MG/1
20 CAPSULE, EXTENDED RELEASE ORAL DAILY
Qty: 30 CAPSULE | Refills: 0 | Status: SHIPPED | OUTPATIENT
Start: 2023-03-27 | End: 2023-04-28

## 2023-03-27 ASSESSMENT — PAIN SCALES - GENERAL: PAINLEVEL: MODERATE PAIN (5)

## 2023-03-27 NOTE — PROGRESS NOTES
Assessment and Plan:     Attention deficit hyperactivity disorder (ADHD), predominantly inattentive type  Patient will continue Adderall as prescribed.  I reviewed the PDMP and there are no concerns today.  Urine drug screen obtained.  New controlled substance agreement signed.  He is to follow-up in 6 months.  - amphetamine-dextroamphetamine (ADDERALL XR) 20 MG 24 hr capsule  Dispense: 30 capsule; Refill: 0  - buPROPion (WELLBUTRIN XL) 300 MG 24 hr tablet  Dispense: 90 tablet; Refill: 3    Anxiety  Mild recurrent major depression (H)  This is controlled with bupropion.  - buPROPion (WELLBUTRIN XL) 300 MG 24 hr tablet  Dispense: 90 tablet; Refill: 3        Subjective:     Seamus is a 37 year old male presenting to the clinic for medication management.  Patient has ADD and is taking Adderall XR 20 mg daily.  He is tolerating the medication well.  He believes it may have be causing some erectile dysfunction, but he does not want to discontinue the medication.  He feels as though the medication assists with helping him focus.  He feels less anxious at work.  He is able to manage tasks more effectively.  He is taking bupropion  mg daily for anxiety and depression which is well controlled.  He denies thoughts of suicide.    Reviewof Systems: A complete 14 point review of systems was obtained and is negative or as stated in the history of present illness.    Social History     Socioeconomic History     Marital status: Single     Spouse name: Not on file     Number of children: Not on file     Years of education: Not on file     Highest education level: Not on file   Occupational History     Not on file   Tobacco Use     Smoking status: Former     Smokeless tobacco: Never     Tobacco comments:     age 18 to 35 with intermittent quitting   Vaping Use     Vaping Use: Never used   Substance and Sexual Activity     Alcohol use: No     Drug use: No     Sexual activity: Yes     Partners: Female     Comment: in  relationship    Other Topics Concern     Not on file   Social History Narrative    Works as political organizers.  Engaged. Daughter Poppy 4 yo.   In a Master's program for Advocacy in Political Leaders     Social Determinants of Health     Financial Resource Strain: Not on file   Food Insecurity: Not on file   Transportation Needs: Not on file   Physical Activity: Not on file   Stress: Not on file   Social Connections: Not on file   Intimate Partner Violence: Not on file   Housing Stability: Not on file       Active Ambulatory Problems     Diagnosis Date Noted     ADHD, Predominantly Inattentive Type      Deviated Nasal Septum (Acquired)      Hypertrophied Nasal Turbinate      Controlled substance agreement signed 11/16/2015     Headache 03/04/2020     Obesity (BMI 35.0-39.9 without comorbidity)      Dyslipidemia      Anxiety      Migraine      Resolved Ambulatory Problems     Diagnosis Date Noted     Major depressive disorder, single episode      Morbid obesity (H) 01/28/2022     Past Medical History:   Diagnosis Date     ADD (attention deficit disorder)      ADHD      Joint pain        Family History   Problem Relation Age of Onset     No Known Problems Mother      No Known Problems Father         circulation issues     Cancer Brother         angiosarcoma       Objective:     /82 (BP Location: Left arm, Patient Position: Sitting, Cuff Size: Adult Regular)   Pulse 75   Temp 98.3  F (36.8  C) (Oral)   Resp 16   Wt 103.3 kg (227 lb 11.2 oz)   SpO2 98%   BMI 35.14 kg/m      Patient is alert, in no obvious distress.   Skin: Warm, dry.    Lungs:  Clear to auscultation. Respirations even and unlabored.  No wheezing or rales noted.   Heart:  Regular rate and rhythm.  No murmurs, S3, S4, gallops, or rubs.

## 2023-03-29 ENCOUNTER — TRANSFERRED RECORDS (OUTPATIENT)
Dept: HEALTH INFORMATION MANAGEMENT | Facility: CLINIC | Age: 38
End: 2023-03-29

## 2023-04-07 ENCOUNTER — MYC MEDICAL ADVICE (OUTPATIENT)
Dept: FAMILY MEDICINE | Facility: CLINIC | Age: 38
End: 2023-04-07
Payer: COMMERCIAL

## 2023-04-10 NOTE — TELEPHONE ENCOUNTER
Scheduled patient for 4/13 visit with PCP and sent MyChart back to patient.    SAGE Austin, RN  [facility]

## 2023-04-13 ENCOUNTER — OFFICE VISIT (OUTPATIENT)
Dept: FAMILY MEDICINE | Facility: CLINIC | Age: 38
End: 2023-04-13
Payer: COMMERCIAL

## 2023-04-13 VITALS
BODY MASS INDEX: 34.97 KG/M2 | DIASTOLIC BLOOD PRESSURE: 78 MMHG | HEART RATE: 93 BPM | OXYGEN SATURATION: 97 % | SYSTOLIC BLOOD PRESSURE: 119 MMHG | RESPIRATION RATE: 14 BRPM | WEIGHT: 226.6 LBS | TEMPERATURE: 98.5 F

## 2023-04-13 DIAGNOSIS — K13.70 ORAL LESION: Primary | ICD-10-CM

## 2023-04-13 PROCEDURE — 99213 OFFICE O/P EST LOW 20 MIN: CPT | Performed by: NURSE PRACTITIONER

## 2023-04-13 RX ORDER — CEPHALEXIN 500 MG/1
CAPSULE ORAL
COMMUNITY
Start: 2023-04-11 | End: 2024-09-13

## 2023-04-13 RX ORDER — ONDANSETRON 8 MG/1
TABLET, FILM COATED ORAL
COMMUNITY
Start: 2023-04-11 | End: 2024-09-13

## 2023-04-13 ASSESSMENT — PAIN SCALES - GENERAL: PAINLEVEL: NO PAIN (0)

## 2023-04-13 NOTE — PROGRESS NOTES
Assessment and Plan:     Oral lesion  Suspect mucocele.  Discussed symptomatic treatment.  Patient is to avoid trauma to the area.  Recommend over-the-counter acetaminophen or ibuprofen as needed for pain.  Will refer to ENT for further evaluation and management due to the size of the lesion.  He is content with the plan.  - Adult ENT  Referral      Subjective:     Seamus is a 37 year old male presenting to the clinic for concerns for an oral lesion.  Patient has a lesion present within his left lower lip which developed 2 months ago.  This fluctuates in size.  Patient believes he has bit his lip in the past.  He denies any pain.  He has not noticed any drainage from the area.  He has been performing salt water rinses and applying ice.  He has a history of smoking and chewing tobacco use.    Reviewof Systems: A complete 14 point review of systems was obtained and is negative or as stated in the history of present illness.    Social History     Socioeconomic History     Marital status: Single     Spouse name: Not on file     Number of children: Not on file     Years of education: Not on file     Highest education level: Not on file   Occupational History     Not on file   Tobacco Use     Smoking status: Former     Smokeless tobacco: Never     Tobacco comments:     age 18 to 35 with intermittent quitting   Vaping Use     Vaping status: Never Used   Substance and Sexual Activity     Alcohol use: No     Drug use: No     Sexual activity: Yes     Partners: Female     Comment: in relationship    Other Topics Concern     Not on file   Social History Narrative    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   In a Master's program for Advocacy in Political Leaders     Social Determinants of Health     Financial Resource Strain: Not on file   Food Insecurity: Not on file   Transportation Needs: Not on file   Physical Activity: Not on file   Stress: Not on file   Social Connections: Not on file    Intimate Partner Violence: Not on file   Housing Stability: Not on file       Active Ambulatory Problems     Diagnosis Date Noted     ADHD, Predominantly Inattentive Type      Deviated Nasal Septum (Acquired)      Hypertrophied Nasal Turbinate      Controlled substance agreement signed 11/16/2015     Headache 03/04/2020     Obesity (BMI 35.0-39.9 without comorbidity)      Dyslipidemia      Anxiety      Migraine      Resolved Ambulatory Problems     Diagnosis Date Noted     Major depressive disorder, single episode      Morbid obesity (H) 01/28/2022     Past Medical History:   Diagnosis Date     ADD (attention deficit disorder)      ADHD      Joint pain        Family History   Problem Relation Age of Onset     No Known Problems Mother      No Known Problems Father         circulation issues     Cancer Brother         angiosarcoma       Objective:     /78 (BP Location: Right arm, Patient Position: Sitting, Cuff Size: Adult Regular)   Pulse 93   Temp 98.5  F (36.9  C) (Oral)   Resp 14   Wt 102.8 kg (226 lb 9.6 oz)   SpO2 97%   BMI 34.97 kg/m      Patient is alert, in no obvious distress.   Skin: Warm, dry.  No lesions or rashes.  Skin turgor rapid return.   HEENT:  Head normocephalic, atraumatic.  Eyes normal. Ears normal.  Nose patent, mucosa pink.  Oropharynx mucosa pink.  No tonsillar enlargement. He has a small marble size raised soft tissue lesion present within his left lower lip.   Neck: Supple, no lymphadenopathy.   Lungs:  Clear to auscultation. Respirations even and unlabored.  No wheezing or rales noted.   Heart:  Regular rate and rhythm.  No murmurs.

## 2023-04-28 ENCOUNTER — MYC REFILL (OUTPATIENT)
Dept: FAMILY MEDICINE | Facility: CLINIC | Age: 38
End: 2023-04-28
Payer: COMMERCIAL

## 2023-04-28 DIAGNOSIS — F90.0 ATTENTION DEFICIT HYPERACTIVITY DISORDER (ADHD), PREDOMINANTLY INATTENTIVE TYPE: ICD-10-CM

## 2023-04-28 RX ORDER — DEXTROAMPHETAMINE SACCHARATE, AMPHETAMINE ASPARTATE MONOHYDRATE, DEXTROAMPHETAMINE SULFATE AND AMPHETAMINE SULFATE 5; 5; 5; 5 MG/1; MG/1; MG/1; MG/1
20 CAPSULE, EXTENDED RELEASE ORAL DAILY
Qty: 30 CAPSULE | Refills: 0 | Status: SHIPPED | OUTPATIENT
Start: 2023-04-28 | End: 2023-05-02

## 2023-05-02 ENCOUNTER — MYC REFILL (OUTPATIENT)
Dept: FAMILY MEDICINE | Facility: CLINIC | Age: 38
End: 2023-05-02
Payer: COMMERCIAL

## 2023-05-02 DIAGNOSIS — F90.0 ATTENTION DEFICIT HYPERACTIVITY DISORDER (ADHD), PREDOMINANTLY INATTENTIVE TYPE: ICD-10-CM

## 2023-05-03 ENCOUNTER — MYC MEDICAL ADVICE (OUTPATIENT)
Dept: FAMILY MEDICINE | Facility: CLINIC | Age: 38
End: 2023-05-03

## 2023-05-03 DIAGNOSIS — F90.0 ATTENTION DEFICIT HYPERACTIVITY DISORDER (ADHD), PREDOMINANTLY INATTENTIVE TYPE: ICD-10-CM

## 2023-05-03 RX ORDER — DEXTROAMPHETAMINE SACCHARATE, AMPHETAMINE ASPARTATE MONOHYDRATE, DEXTROAMPHETAMINE SULFATE AND AMPHETAMINE SULFATE 5; 5; 5; 5 MG/1; MG/1; MG/1; MG/1
20 CAPSULE, EXTENDED RELEASE ORAL DAILY
Qty: 30 CAPSULE | Refills: 0 | Status: SHIPPED | OUTPATIENT
Start: 2023-05-03 | End: 2023-05-04

## 2023-05-04 RX ORDER — DEXTROAMPHETAMINE SACCHARATE, AMPHETAMINE ASPARTATE MONOHYDRATE, DEXTROAMPHETAMINE SULFATE AND AMPHETAMINE SULFATE 5; 5; 5; 5 MG/1; MG/1; MG/1; MG/1
20 CAPSULE, EXTENDED RELEASE ORAL DAILY
Qty: 30 CAPSULE | Refills: 0 | Status: SHIPPED | OUTPATIENT
Start: 2023-05-04 | End: 2023-06-03

## 2023-05-04 NOTE — TELEPHONE ENCOUNTER
Medication last filled:     Last clinic visit: 3/27/2023    Clinic visit frequency required: Q 6  months    Next clinic visit: N/A    Controlled substance agreement on file: Yes:  Date 11/16/2015.    Urine Drug Screen on file:  Yes     Pending Prescriptions:                       Disp   Refills    amphetamine-dextroamphetamine (ADDERALL X*30 cap*0            Sig: Take 1 capsule (20 mg) by mouth daily

## 2023-06-29 ENCOUNTER — TRANSFERRED RECORDS (OUTPATIENT)
Dept: HEALTH INFORMATION MANAGEMENT | Facility: CLINIC | Age: 38
End: 2023-06-29
Payer: COMMERCIAL

## 2023-07-16 ENCOUNTER — E-VISIT (OUTPATIENT)
Dept: FAMILY MEDICINE | Facility: CLINIC | Age: 38
End: 2023-07-16
Payer: COMMERCIAL

## 2023-07-16 DIAGNOSIS — R21 RASH: Primary | ICD-10-CM

## 2023-07-16 PROCEDURE — 99207 PR NON-BILLABLE SERV PER CHARTING: CPT | Performed by: NURSE PRACTITIONER

## 2023-07-16 RX ORDER — MICONAZOLE NITRATE 20 MG/G
CREAM TOPICAL 2 TIMES DAILY
Qty: 35 G | Refills: 1 | Status: SHIPPED | OUTPATIENT
Start: 2023-07-16

## 2023-07-17 ENCOUNTER — MYC MEDICAL ADVICE (OUTPATIENT)
Dept: FAMILY MEDICINE | Facility: CLINIC | Age: 38
End: 2023-07-17
Payer: COMMERCIAL

## 2023-07-17 NOTE — TELEPHONE ENCOUNTER
[facility]os[facility] message sent back to patient with PCP recommendations.     JO-ANN AustinN, RN  [facility]

## 2023-07-17 NOTE — TELEPHONE ENCOUNTER
MRN:[id number]                      After Visit Summary   12/19/2017    Iqra Mccord    MRN: [id number]           Thank you!     Thank you for choosing [facility] for your care. Our goal is always to provide you with excellent care. Hearing back from our patients is one way we can continue to improve our services. Please take a few minutes to complete the written survey that you may receive in the mail after you visit. If you would like to speak to someone directly about your visit please contact Patient Relations at [phone]. Thank you!          Patient Information     Date Of Birth          1973        About your hospital stay     You were admitted on:  December 19, 2017 You last received care in the:  [facility] PreOP/PostOP    You were discharged on:  December 19, 2017       Who to Call     For medical emergencies, please call 911.  For non-urgent questions about your medical care, please call your primary care provider or clinic, [phone]  For questions related to your surgery, please call your surgery clinic        Attending Provider     Provider Delta Pearce MD OB/Gyn       Primary Care Provider Office Phone # Fax #    Azul Oh [phone]      After Care Instructions     Discharge Instructions       Resume pre procedure diet            Discharge Instructions       Pelvic Rest. No tampons, douching or intercourse for  2  weeks.            Discharge Instructions       Patient may return to work POD 1 or 2            Discharge Instructions       Patient may drive beginning POD 1 if not taking narcotic pain pills            Discharge Instructions       Patient to arrange follow up appointment in 6 weeks            No alcohol       NO ALCOHOL for 24 hours post procedure            No driving or operating machinery       No driving or operating machinery until day after procedure                  Further instructions from your  He can apply the same cream to his lips for 1-3 weeks as well.  He should avoid licking his lips.  Thanks.   care team       DILATION AND CURETTAGE AND DILATION AND EVACUATION DISCHARGE INSTRUCTIONS    DO NOT DRIVE A CAR, DRINK ALCOHOL OR USE MACHINERY FOR THE NEXT 24 HOURS.  YOU SHOULD WAIT UNTIL YOU HAVE RECOVERED BEFORE MAKING ANY IMPORTANT DECISIONS.    PAIN AND DISCOMFORT  YOU MAY HAVE CRAMPS OR A LOW BACKACHE FOR 24 TO 48 HOURS.  TYLENOL (ACETAMINOPHEN) OR MOTRIN (IBUPROFEN) MAY HELP, OR YOUR DOCTOR MAY GIVE YOU PAIN MEDICINE.  CALL YOUR DOCTOR IF PAIN CANNOT BE CONTROLLED.  YOU MAY FEEL DROWSY AND WEAK FOR A DAY OR TWO.    VAGINAL DISCHARGE  YOU MAY HAVE SOME BLEEDING OR DISCHARGE FOR UP TO TWO WEEKS.  DO NOT DOUCHE, USE TAMPONS OR HAVE SEX (INTERCOURSE) IN THE FIRST WEEK.  CALL YOUR DOCTOR IF YOU SOAK MORE THAN ONE MAXI PAD (SANITARY [place]KIN) PER HOUR, OR IF YOU PASS LARGE BLOOD CLOTS.    OTHER SYMPTOMS  YOU MAY HAVE A LOW FEVER FOR THE FIRST TWO DAYS.  CALL YOUR DOCTOR IF YOUR FEVER GOES OVER 101 DEGREES FAHRENHEIT.    IF YOU HAVE NAUSEA (FEEL SICK TO YOUR STOMACH), STAY IN BED.  TRY DRINKING A SMALL AMOUNT 7-UP, TEA OR SOUP.    DIET AND ACTIVITY  EAT LIGHT MEALS AND DRINK PLENTY OF FLUIDS FOR THE FIRST 24 HOURS (OR LONGER, IF YOU HAVE NAUSEA).    YOU MAY BATHE, SHOWER AND CLIMB STAIRS.  MOST WOMEN CAN RETURN TO WORK AFTER 24 HOURS.  YOU MAY GO BACK TO YOUR OTHER ACTIVITIES AFTER YOUR PAIN GOES AWAY.      GENERAL ANESTHESIA OR SEDATION ADULT DISCHARGE INSTRUCTIONS   SPECIAL PRECAUTIONS FOR 24 HOURS AFTER SURGERY    IT IS NOT UNUSUAL TO FEEL LIGHT-HEADED OR FAINT, UP TO 24 HOURS AFTER SURGERY OR WHILE TAKING PAIN MEDICATION.  IF YOU HAVE THESE SYMPTOMS; SIT FOR A FEW MINUTES BEFORE STANDING AND HAVE SOMEONE ASSIST YOU WHEN YOU GET UP TO WALK OR USE THE BATHROOM.    YOU SHOULD REST AND RELAX FOR THE NEXT 24 HOURS AND YOU MUST MAKE ARRANGEMENTS TO HAVE SOMEONE STAY WITH YOU FOR AT LEAST 24 HOURS AFTER YOUR DISCHARGE.  AVOID HAZARDOUS AND STRENUOUS ACTIVITIES.  DO NOT MAKE IMPORTANT DECISIONS FOR 24 HOURS.    DO NOT  "DRIVE ANY VEHICLE OR OPERATE MECHANICAL EQUIPMENT FOR 24 HOURS FOLLOWING THE END OF YOUR SURGERY.  EVEN THOUGH YOU MAY FEEL NORMAL, YOUR REACTIONS MAY BE AFFECTED BY THE MEDICATION YOU HAVE RECEIVED.    DO NOT DRINK ALCOHOLIC BEVERAGES FOR 24 HOURS FOLLOWING YOUR SURGERY.    DRINK CLEAR LIQUIDS (APPLE JUICE, GINGER ALE, 7-UP, BROTH, ETC.).  PROGRESS TO YOUR REGULAR DIET AS YOU FEEL ABLE.    YOU MAY HAVE A DRY MOUTH, A SORE THROAT, MUSCLES ACHES OR TROUBLE SLEEPING.  THESE SHOULD GO AWAY AFTER 24 HOURS.    CALL YOUR DOCTOR FOR ANY OF THE FOLLOWING:  SIGNS OF INFECTION (FEVER, GROWING TENDERNESS AT THE SURGERY SITE, A LARGE AMOUNT OF DRAINAGE OR BLEEDING, SEVERE PAIN, FOUL-SMELLING DRAINAGE, REDNESS OR SWELLING.    IT HAS BEEN OVER 8 TO 10 HOURS SINCE SURGERY AND YOU ARE STILL NOT ABLE TO URINATE (PASS WATER).       You received Toradol, an IV form of ibuprofen (Motrin) at 07:15 am.  Do not take any ibuprofen products until 01:15 pm.  Maximum acetaminophen (Tylenol) dose from all sources should not exceed 4 grams (4000 mg) per day. You've had 975 mg today.            Pending Results     Date and Time Order Name Status Description    12/19/2017 0755 Surgical pathology exam In process             Admission Information     Date & Time Provider Department Dept. Phone    12/19/2017 Delta Wang MD [facility] PreOP/PostOP [phone]      Your Vitals Were     Blood Pressure Pulse Temperature Respirations Height Weight    143/96 93 97.9  F (36.6  C) (Temporal) 14 1.727 m (5' 8\") 86.2 kg (190 lb)    Last Period Pulse Oximetry BMI (Body Mass Index)             12/14/2017 98% 28.89 kg/m2         MyChart Information     [facility] gives you secure access to your electronic health record. If you see a primary care provider, you can also send messages to your care team and make appointments. If you have questions, please call your primary care clinic.  If you do not have a primary care provider, please call " [phone] and they will assist you.        Care EveryWhere ID     This is your Care EveryWhere ID. This could be used by other organizations to access your [place] medical records  [id number]        Equal Access to Services     FADI GEIGER : Skinny White, wabhavnada luqadaha, qaybta kaalmada mabel, sofía sheebain hayaaradha cherydaniellili lopez. So [facility] [phone].    ATENCIÓN: Si habla español, tiene a carter disposición servicios gratuitos de asistencia lingüística. Llame al [phone].    We comply with applicable federal civil rights laws and [place] laws. We do not discriminate on the basis of race, color, national origin, age, disability, sex, sexual orientation, or gender identity.               Review of your medicines      START taking        Dose / Directions    oxyCODONE IR 5 MG tablet   Commonly known as:  ROXICODONE   Used for:  S/P D&C (status post dilation and curettage)        Dose:  5 mg   Take 1 tablet (5 mg) by mouth every 6 hours as needed for moderate to severe pain maximum 4 tablet(s) per day   Quantity:  10 tablet   Refills:  0         CONTINUE these medicines which have NOT CHANGED        Dose / Directions    albuterol 90 MCG/ACT inhaler   Used for:  Mild intermittent asthma        Dose:  1-2 puff   Inhale 1-2 puffs into the lungs as needed.   Quantity:  1 Inhaler   Refills:  11       atorvastatin 20 MG tablet   Commonly known as:  LIPITOR        Dose:  20 mg   Take 20 mg by mouth every evening   Refills:  0       cetirizine 10 MG tablet   Commonly known as:  zyrTEC        Dose:  10 mg   Take 10 mg by mouth daily   Refills:  0       fluticasone 50 MCG/ACT spray   Commonly known as:  FLONASE        Dose:  1-2 spray   Spray 1-2 sprays into both nostrils daily as needed for rhinitis or allergies   Refills:  0       FOLIC ACID PO        Dose:  1 mg   Take 1 mg by mouth daily   Refills:  0       MULTIVITAMIN ADULT PO        Dose:  1 tablet   Take 1 tablet by mouth daily    Refills:  0       OMEPRAZOLE PO        Dose:  20 mg   Take 20 mg by mouth every morning   Refills:  0       PROPRANOLOL HCL PO   Indication:  Anxiety Related to Current Life Problems        Dose:  20 mg   Take 20 mg by mouth 3 times daily   Refills:  0       sertraline 100 MG tablet   Commonly known as:  ZOLOFT   Indication:  takes only 200 mg        Dose:  200 mg   Take 200 mg by mouth daily (takes one and a half tabs). Dose confirmed by [facility].   Refills:  0            Where to get your medicines      Some of these will need a paper prescription and others can be bought over the counter. Ask your nurse if you have questions.     Bring a paper prescription for each of these medications     oxyCODONE IR 5 MG tablet                Protect others around you: Learn how to safely use, store and throw away your medicines at [email or web address].             Medication List: This is a list of all your medications and when to take them. Check marks below indicate your daily home schedule. Keep this list as a reference.      Medications           Morning Afternoon Evening Bedtime As Needed    albuterol 90 MCG/ACT inhaler   Inhale 1-2 puffs into the lungs as needed.                                atorvastatin 20 MG tablet   Commonly known as:  LIPITOR   Take 20 mg by mouth every evening                                cetirizine 10 MG tablet   Commonly known as:  zyrTEC   Take 10 mg by mouth daily                                fluticasone 50 MCG/ACT spray   Commonly known as:  FLONASE   Spray 1-2 sprays into both nostrils daily as needed for rhinitis or allergies                                FOLIC ACID PO   Take 1 mg by mouth daily                                MULTIVITAMIN ADULT PO   Take 1 tablet by mouth daily                                OMEPRAZOLE PO   Take 20 mg by mouth every morning                                oxyCODONE IR 5 MG tablet   Commonly known as:  ROXICODONE   Take 1 tablet (5 mg) by mouth  every 6 hours as needed for moderate to severe pain maximum 4 tablet(s) per day   Last time this was given:  5 mg on 12/19/2017  8:37 AM                                PROPRANOLOL HCL PO   Take 20 mg by mouth 3 times daily                                sertraline 100 MG tablet   Commonly known as:  ZOLOFT   Take 200 mg by mouth daily (takes one and a half tabs). Dose confirmed by [facility].

## 2023-07-17 NOTE — PATIENT INSTRUCTIONS
Dear Seamus Pereira    I sent a prescription for Miconazole cream to the pharmacy.  You can apply this twice daily for 1-3 weeks.  I recommend avoiding sexual intercourse until the irritation improves. If your symptoms persist, please let me know.      Thanks for choosing us as your health care partner,    ELPIDIO Iyer CNP

## 2023-07-17 NOTE — TELEPHONE ENCOUNTER
"Patient had e-visit completed by PCP on 7/16/23.    Recommendations were as follows, \"I sent a prescription for Miconazole cream to the pharmacy.  You can apply this twice daily for 1-3 weeks.  I recommend avoiding sexual intercourse until the irritation improves. If your symptoms persist, please let me know.\"    Routing to PCP to make recommendations on new concerns.     SAGE [place], RN  [facility]      "

## 2023-08-11 ENCOUNTER — TRANSFERRED RECORDS (OUTPATIENT)
Dept: HEALTH INFORMATION MANAGEMENT | Facility: CLINIC | Age: 38
End: 2023-08-11
Payer: COMMERCIAL

## 2024-03-02 ENCOUNTER — HEALTH MAINTENANCE LETTER (OUTPATIENT)
Age: 39
End: 2024-03-02

## 2024-06-11 DIAGNOSIS — F90.0 ATTENTION DEFICIT HYPERACTIVITY DISORDER (ADHD), PREDOMINANTLY INATTENTIVE TYPE: ICD-10-CM

## 2024-06-11 DIAGNOSIS — F33.0 MILD RECURRENT MAJOR DEPRESSION (H): ICD-10-CM

## 2024-06-11 DIAGNOSIS — F41.9 ANXIETY: ICD-10-CM

## 2024-06-11 RX ORDER — BUPROPION HYDROCHLORIDE 300 MG/1
300 TABLET ORAL EVERY MORNING
Qty: 90 TABLET | Refills: 0 | Status: SHIPPED | OUTPATIENT
Start: 2024-06-11 | End: 2024-09-13

## 2024-09-13 ENCOUNTER — OFFICE VISIT (OUTPATIENT)
Dept: FAMILY MEDICINE | Facility: CLINIC | Age: 39
End: 2024-09-13
Payer: COMMERCIAL

## 2024-09-13 VITALS
SYSTOLIC BLOOD PRESSURE: 131 MMHG | RESPIRATION RATE: 15 BRPM | HEIGHT: 67 IN | WEIGHT: 234 LBS | HEART RATE: 66 BPM | OXYGEN SATURATION: 99 % | TEMPERATURE: 98.2 F | DIASTOLIC BLOOD PRESSURE: 88 MMHG | BODY MASS INDEX: 36.73 KG/M2

## 2024-09-13 DIAGNOSIS — Z79.899 MEDICATION MANAGEMENT: Primary | ICD-10-CM

## 2024-09-13 DIAGNOSIS — F41.9 ANXIETY: ICD-10-CM

## 2024-09-13 DIAGNOSIS — F90.0 ATTENTION DEFICIT HYPERACTIVITY DISORDER (ADHD), PREDOMINANTLY INATTENTIVE TYPE: ICD-10-CM

## 2024-09-13 DIAGNOSIS — Z13.220 LIPID SCREENING: ICD-10-CM

## 2024-09-13 DIAGNOSIS — E66.01 CLASS 2 SEVERE OBESITY DUE TO EXCESS CALORIES WITH SERIOUS COMORBIDITY AND BODY MASS INDEX (BMI) OF 36.0 TO 36.9 IN ADULT (H): ICD-10-CM

## 2024-09-13 DIAGNOSIS — F33.0 MILD RECURRENT MAJOR DEPRESSION (H): ICD-10-CM

## 2024-09-13 DIAGNOSIS — E66.812 CLASS 2 SEVERE OBESITY DUE TO EXCESS CALORIES WITH SERIOUS COMORBIDITY AND BODY MASS INDEX (BMI) OF 36.0 TO 36.9 IN ADULT (H): ICD-10-CM

## 2024-09-13 DIAGNOSIS — R21 RASH: ICD-10-CM

## 2024-09-13 DIAGNOSIS — Z13.1 DIABETES MELLITUS SCREENING: ICD-10-CM

## 2024-09-13 DIAGNOSIS — Z00.00 ENCOUNTER FOR ROUTINE HISTORY AND PHYSICAL EXAM FOR MALE: ICD-10-CM

## 2024-09-13 LAB
ALBUMIN SERPL BCG-MCNC: 4.8 G/DL (ref 3.5–5.2)
ALP SERPL-CCNC: 88 U/L (ref 40–150)
ALT SERPL W P-5'-P-CCNC: 76 U/L (ref 0–70)
AMPHETAMINES UR QL SCN: ABNORMAL
ANION GAP SERPL CALCULATED.3IONS-SCNC: 11 MMOL/L (ref 7–15)
AST SERPL W P-5'-P-CCNC: 48 U/L (ref 0–45)
BARBITURATES UR QL SCN: ABNORMAL
BENZODIAZ UR QL SCN: ABNORMAL
BILIRUB SERPL-MCNC: 0.4 MG/DL
BUN SERPL-MCNC: 24.4 MG/DL (ref 6–20)
BZE UR QL SCN: ABNORMAL
CALCIUM SERPL-MCNC: 9.7 MG/DL (ref 8.8–10.4)
CANNABINOIDS UR QL SCN: ABNORMAL
CHLORIDE SERPL-SCNC: 102 MMOL/L (ref 98–107)
CHOLEST SERPL-MCNC: 203 MG/DL
CREAT SERPL-MCNC: 1.05 MG/DL (ref 0.67–1.17)
EGFRCR SERPLBLD CKD-EPI 2021: >90 ML/MIN/1.73M2
FASTING STATUS PATIENT QL REPORTED: ABNORMAL
FASTING STATUS PATIENT QL REPORTED: ABNORMAL
FENTANYL UR QL: ABNORMAL
GLUCOSE SERPL-MCNC: 100 MG/DL (ref 70–99)
HBA1C MFR BLD: 5.6 % (ref 0–5.6)
HCO3 SERPL-SCNC: 25 MMOL/L (ref 22–29)
HDLC SERPL-MCNC: 50 MG/DL
LDLC SERPL CALC-MCNC: 140 MG/DL
NONHDLC SERPL-MCNC: 153 MG/DL
OPIATES UR QL SCN: ABNORMAL
PCP QUAL URINE (ROCHE): ABNORMAL
POTASSIUM SERPL-SCNC: 4.5 MMOL/L (ref 3.4–5.3)
PROT SERPL-MCNC: 7.7 G/DL (ref 6.4–8.3)
SODIUM SERPL-SCNC: 138 MMOL/L (ref 135–145)
TRIGL SERPL-MCNC: 67 MG/DL

## 2024-09-13 PROCEDURE — 90471 IMMUNIZATION ADMIN: CPT | Performed by: NURSE PRACTITIONER

## 2024-09-13 PROCEDURE — 80061 LIPID PANEL: CPT | Performed by: NURSE PRACTITIONER

## 2024-09-13 PROCEDURE — 99214 OFFICE O/P EST MOD 30 MIN: CPT | Mod: 25 | Performed by: NURSE PRACTITIONER

## 2024-09-13 PROCEDURE — 80053 COMPREHEN METABOLIC PANEL: CPT | Performed by: NURSE PRACTITIONER

## 2024-09-13 PROCEDURE — 99395 PREV VISIT EST AGE 18-39: CPT | Mod: 25 | Performed by: NURSE PRACTITIONER

## 2024-09-13 PROCEDURE — 80307 DRUG TEST PRSMV CHEM ANLYZR: CPT | Performed by: NURSE PRACTITIONER

## 2024-09-13 PROCEDURE — 83036 HEMOGLOBIN GLYCOSYLATED A1C: CPT | Performed by: NURSE PRACTITIONER

## 2024-09-13 PROCEDURE — 96127 BRIEF EMOTIONAL/BEHAV ASSMT: CPT | Performed by: NURSE PRACTITIONER

## 2024-09-13 PROCEDURE — 90656 IIV3 VACC NO PRSV 0.5 ML IM: CPT | Performed by: NURSE PRACTITIONER

## 2024-09-13 PROCEDURE — 36415 COLL VENOUS BLD VENIPUNCTURE: CPT | Performed by: NURSE PRACTITIONER

## 2024-09-13 RX ORDER — BUPROPION HYDROCHLORIDE 300 MG/1
300 TABLET ORAL EVERY MORNING
Qty: 90 TABLET | Refills: 3 | Status: SHIPPED | OUTPATIENT
Start: 2024-09-13

## 2024-09-13 RX ORDER — HYDROXYZINE HYDROCHLORIDE 10 MG/1
10-20 TABLET, FILM COATED ORAL 3 TIMES DAILY PRN
Qty: 30 TABLET | Refills: 2 | Status: SHIPPED | OUTPATIENT
Start: 2024-09-13

## 2024-09-13 RX ORDER — DEXTROAMPHETAMINE SACCHARATE, AMPHETAMINE ASPARTATE MONOHYDRATE, DEXTROAMPHETAMINE SULFATE AND AMPHETAMINE SULFATE 5; 5; 5; 5 MG/1; MG/1; MG/1; MG/1
20 CAPSULE, EXTENDED RELEASE ORAL DAILY
Qty: 30 CAPSULE | Refills: 0 | Status: SHIPPED | OUTPATIENT
Start: 2024-09-13 | End: 2024-09-18

## 2024-09-13 ASSESSMENT — ANXIETY QUESTIONNAIRES
7. FEELING AFRAID AS IF SOMETHING AWFUL MIGHT HAPPEN: SEVERAL DAYS
8. IF YOU CHECKED OFF ANY PROBLEMS, HOW DIFFICULT HAVE THESE MADE IT FOR YOU TO DO YOUR WORK, TAKE CARE OF THINGS AT HOME, OR GET ALONG WITH OTHER PEOPLE?: VERY DIFFICULT
GAD7 TOTAL SCORE: 18

## 2024-09-13 ASSESSMENT — PATIENT HEALTH QUESTIONNAIRE - PHQ9
SUM OF ALL RESPONSES TO PHQ QUESTIONS 1-9: 12
SUM OF ALL RESPONSES TO PHQ QUESTIONS 1-9: 12
10. IF YOU CHECKED OFF ANY PROBLEMS, HOW DIFFICULT HAVE THESE PROBLEMS MADE IT FOR YOU TO DO YOUR WORK, TAKE CARE OF THINGS AT HOME, OR GET ALONG WITH OTHER PEOPLE: VERY DIFFICULT

## 2024-09-13 ASSESSMENT — PAIN SCALES - GENERAL: PAINLEVEL: NO PAIN (0)

## 2024-09-13 NOTE — PROGRESS NOTES
Assessment and Plan:     Encounter for routine history and physical exam for male  Recommend consuming a healthy diet and exercising.  He declines HIV and hepatitis C screening.  He is up-to-date on vaccinations.    Diabetes mellitus screening  - Hemoglobin A1c  - Hemoglobin A1c    Lipid screening  - Lipid panel reflex to direct LDL Non-fasting  - Lipid panel reflex to direct LDL Non-fasting    Attention deficit hyperactivity disorder (ADHD), predominantly inattentive type  Discussed treatment options.  He will continue bupropion as prescribed.  Will start Adderall XR 20 mg daily.  Educated on its indications and side effects.  I reviewed the PDMP and there are no concerns today.  Controlled substance agreement and urine drug screen obtained.  He is to follow-up in 1 month.  - buPROPion (WELLBUTRIN XL) 300 MG 24 hr tablet  Dispense: 90 tablet; Refill: 3  - amphetamine-dextroamphetamine (ADDERALL XR) 20 MG 24 hr capsule  Dispense: 30 capsule; Refill: 0    Class 2 severe obesity due to excess calories with serious comorbidity and body mass index (BMI) of 36.0 to 36.9 in adult (H)  Recommend consuming a healthy diet and exercising.    Anxiety  Will treat with hydroxyzine as needed.  Educated on its indications and side effects.  He is to avoid taking this with other sedatives.  - hydrOXYzine HCl (ATARAX) 10 MG tablet  Dispense: 30 tablet; Refill: 2  - buPROPion (WELLBUTRIN XL) 300 MG 24 hr tablet  Dispense: 90 tablet; Refill: 3    Mild recurrent major depression (H24)  He continues to bupropion.  - buPROPion (WELLBUTRIN XL) 300 MG 24 hr tablet  Dispense: 90 tablet; Refill: 3    Rash  Will refer to allergist for further evaluation.  - Adult Allergy/Asthma  Referral    Medication management  - Comprehensive metabolic panel (BMP + Alb, Alk Phos, ALT, AST, Total. Bili, TP)  - Urine Drug Screen  - Comprehensive metabolic panel (BMP + Alb, Alk Phos, ALT, AST, Total. Bili, TP)  - Urine Drug Screen        Subjective:      Seamus is a 39 year old male presenting to the clinic for a male physical.  Patient has been in a relationship for 1 year.  This is with his ex girlfriend whom he shares a daughter with.  His daughter is 9 years old.  He had a vasectomy.  He has no concerns for STDs.  He is consuming a healthy diet.  He is strength training twice weekly.  Patient has ADHD.  He is taking bupropion  mg daily.  He took Adderall in the past and found relief with this.  He is interested in resuming this.  He has found that he is restless and irritable.  He works as  for the election.  Patient will be losing his job after the election.  He admits to added stress due to this.  He denies thoughts of suicide.  He feels well supported.  He has noticed an increase in anxiety which peaks at bedtime.  Patient is concerned of an intermittent rash that develops when he is outdoors.  He saw dermatology for this in the past.  Dermatology recommended antihistamine and prednisone.  Patient states his eyes will swell and he develops a rash on various parts of his body.  He is interested in seeing an allergist.    Reviewof Systems: A complete 14 point review of systems was obtained and is negative or as stated in the history of present illness.    Social History     Socioeconomic History    Marital status: Single     Spouse name: Not on file    Number of children: Not on file    Years of education: Not on file    Highest education level: Not on file   Occupational History    Not on file   Tobacco Use    Smoking status: Former     Passive exposure: Past    Smokeless tobacco: Never    Tobacco comments:     age 18 to 35 with intermittent quitting   Vaping Use    Vaping status: Never Used   Substance and Sexual Activity    Alcohol use: No    Drug use: No    Sexual activity: Yes     Partners: Female     Comment: in relationship    Other Topics Concern    Not on file   Social History Narrative    Works as political organizers.   Engaged. Daughter Poppy 6 yo.   In a Master's program for Advocacy in Political Leaders     Social Determinants of Health     Financial Resource Strain: Low Risk  (9/13/2024)    Financial Resource Strain     Within the past 12 months, have you or your family members you live with been unable to get utilities (heat, electricity) when it was really needed?: No   Food Insecurity: Low Risk  (9/13/2024)    Food Insecurity     Within the past 12 months, did you worry that your food would run out before you got money to buy more?: No     Within the past 12 months, did the food you bought just not last and you didn t have money to get more?: No   Transportation Needs: Low Risk  (9/13/2024)    Transportation Needs     Within the past 12 months, has lack of transportation kept you from medical appointments, getting your medicines, non-medical meetings or appointments, work, or from getting things that you need?: No   Physical Activity: Insufficiently Active (9/13/2024)    Exercise Vital Sign     Days of Exercise per Week: 3 days     Minutes of Exercise per Session: 40 min   Stress: Stress Concern Present (9/13/2024)    [employer] [place] [employer] - Occupational Stress Questionnaire     Feeling of Stress : Very much   Social Connections: Unknown (9/13/2024)    Social Connection and Isolation Panel [NHANES]     Frequency of Communication with Friends and Family: Not on file     Frequency of Social Gatherings with Friends and Family: Once a week     Attends Zoroastrian Services: Not on file     Active Member of Clubs or Organizations: Not on file     Attends Club or Organization Meetings: Not on file     Marital Status: Not on file   Interpersonal Safety: Low Risk  (9/13/2024)    Interpersonal Safety     Do you feel physically and emotionally safe where you currently live?: Yes     Within the past 12 months, have you been hit, slapped, kicked or otherwise physically hurt by someone?: No     Within the past 12  "months, have you been humiliated or emotionally abused in other ways by your partner or ex-partner?: No   Housing Stability: Low Risk  (9/13/2024)    Housing Stability     Do you have housing? : Yes     Are you worried about losing your housing?: No       Active Ambulatory Problems     Diagnosis Date Noted    ADHD, Predominantly Inattentive Type     Controlled substance agreement signed 11/16/2015    Headache 03/04/2020    Obesity (BMI 35.0-39.9 without comorbidity)     Dyslipidemia     Anxiety     Migraine      Resolved Ambulatory Problems     Diagnosis Date Noted    Deviated Nasal Septum (Acquired)     Hypertrophied Nasal Turbinate     Major depressive disorder, single episode     Morbid obesity (H) 01/28/2022     Past Medical History:   Diagnosis Date    ADD (attention deficit disorder)     ADHD     Joint pain        Family History   Problem Relation Age of Onset    No Known Problems Mother     No Known Problems Father         circulation issues    Cancer Brother         angiosarcoma       Objective:     /88   Pulse 66   Temp 98.2  F (36.8  C)   Resp 15   Ht 1.702 m (5' 7\")   Wt 106.1 kg (234 lb)   SpO2 99%   BMI 36.65 kg/m      Patient is alert, in no obvious distress.   Skin: Warm, dry.  No lesions or rashes.  Skin turgor rapid return.   HEENT:  Head normocephalic, atraumatic.  Eyes normal.  PERRL.  EOM's intact.  No nystagmus. Ears normal.  Nose patent, mucosa pink.  Oropharynx mucosa pink.  No lesions or tonsillar enlargement.   Neck: Supple, no lymphadenopathy, JVD, bruits noted.  No thyromegaly.  Lungs:  Clear to auscultation. Respirations even and unlabored.  No wheezing or rales noted.   Heart:  Regular rate and rhythm.  No murmurs, S3, S4, gallops, or rubs.    Abdomen: Soft, nontender.  No organomegaly. Bowel sounds normoactive. No guarding or masses noted.   :  deferred  Musculoskeletal:  Full ROM of extremities.  DTRs symmetrical, sensations intact.  No obvious deformity.  Muscle " strength equal +5/5.   Neurological:  Cranial nerves 2-12 intact.          Answers submitted by the patient for this visit:  Patient Health Questionnaire (Submitted on 9/13/2024)  If you checked off any problems, how difficult have these problems made it for you to do your work, take care of things at home, or get along with other people?: Very difficult  PHQ9 TOTAL SCORE: 12  Patient Health Questionnaire (G7) (Submitted on 9/13/2024)  ABRAM 7 TOTAL SCORE: 18

## 2024-09-16 DIAGNOSIS — F90.0 ATTENTION DEFICIT HYPERACTIVITY DISORDER (ADHD), PREDOMINANTLY INATTENTIVE TYPE: Primary | ICD-10-CM

## 2024-09-17 DIAGNOSIS — F90.0 ATTENTION DEFICIT HYPERACTIVITY DISORDER (ADHD), PREDOMINANTLY INATTENTIVE TYPE: Primary | ICD-10-CM

## 2024-09-17 RX ORDER — LISDEXAMFETAMINE DIMESYLATE 30 MG/1
30 CAPSULE ORAL EVERY MORNING
Qty: 30 CAPSULE | Refills: 0 | Status: SHIPPED | OUTPATIENT
Start: 2024-09-17

## 2024-10-11 RX ORDER — DEXTROAMPHETAMINE SACCHARATE, AMPHETAMINE ASPARTATE MONOHYDRATE, DEXTROAMPHETAMINE SULFATE AND AMPHETAMINE SULFATE 5; 5; 5; 5 MG/1; MG/1; MG/1; MG/1
CAPSULE, EXTENDED RELEASE ORAL
COMMUNITY
End: 2024-10-16

## 2024-10-11 RX ORDER — HYDROCORTISONE 25 MG/G
CREAM TOPICAL
COMMUNITY
Start: 2024-07-03

## 2024-10-11 RX ORDER — KETOCONAZOLE 20 MG/G
CREAM TOPICAL
COMMUNITY
Start: 2024-07-03

## 2024-10-16 ENCOUNTER — VIRTUAL VISIT (OUTPATIENT)
Dept: FAMILY MEDICINE | Facility: CLINIC | Age: 39
End: 2024-10-16
Payer: COMMERCIAL

## 2024-10-16 DIAGNOSIS — F90.0 ATTENTION DEFICIT HYPERACTIVITY DISORDER (ADHD), PREDOMINANTLY INATTENTIVE TYPE: Primary | ICD-10-CM

## 2024-10-16 DIAGNOSIS — G47.00 INSOMNIA, UNSPECIFIED TYPE: ICD-10-CM

## 2024-10-16 DIAGNOSIS — F41.9 ANXIETY: ICD-10-CM

## 2024-10-16 DIAGNOSIS — F33.0 MILD RECURRENT MAJOR DEPRESSION (H): ICD-10-CM

## 2024-10-16 PROCEDURE — 99214 OFFICE O/P EST MOD 30 MIN: CPT | Mod: 95 | Performed by: NURSE PRACTITIONER

## 2024-10-16 RX ORDER — LISDEXAMFETAMINE DIMESYLATE 30 MG/1
30 CAPSULE ORAL EVERY MORNING
Qty: 30 CAPSULE | Refills: 0 | Status: SHIPPED | OUTPATIENT
Start: 2024-10-16

## 2024-10-16 RX ORDER — HYDROXYZINE HYDROCHLORIDE 25 MG/1
25 TABLET, FILM COATED ORAL 3 TIMES DAILY PRN
Qty: 90 TABLET | Refills: 2 | Status: SHIPPED | OUTPATIENT
Start: 2024-10-16

## 2024-10-16 ASSESSMENT — ANXIETY QUESTIONNAIRES
6. BECOMING EASILY ANNOYED OR IRRITABLE: NEARLY EVERY DAY
GAD7 TOTAL SCORE: 19
2. NOT BEING ABLE TO STOP OR CONTROL WORRYING: NEARLY EVERY DAY
IF YOU CHECKED OFF ANY PROBLEMS ON THIS QUESTIONNAIRE, HOW DIFFICULT HAVE THESE PROBLEMS MADE IT FOR YOU TO DO YOUR WORK, TAKE CARE OF THINGS AT HOME, OR GET ALONG WITH OTHER PEOPLE: VERY DIFFICULT
7. FEELING AFRAID AS IF SOMETHING AWFUL MIGHT HAPPEN: SEVERAL DAYS
4. TROUBLE RELAXING: NEARLY EVERY DAY
GAD7 TOTAL SCORE: 19
8. IF YOU CHECKED OFF ANY PROBLEMS, HOW DIFFICULT HAVE THESE MADE IT FOR YOU TO DO YOUR WORK, TAKE CARE OF THINGS AT HOME, OR GET ALONG WITH OTHER PEOPLE?: VERY DIFFICULT
1. FEELING NERVOUS, ANXIOUS, OR ON EDGE: NEARLY EVERY DAY
3. WORRYING TOO MUCH ABOUT DIFFERENT THINGS: NEARLY EVERY DAY
5. BEING SO RESTLESS THAT IT IS HARD TO SIT STILL: NEARLY EVERY DAY
7. FEELING AFRAID AS IF SOMETHING AWFUL MIGHT HAPPEN: SEVERAL DAYS
GAD7 TOTAL SCORE: 19

## 2024-10-16 ASSESSMENT — PATIENT HEALTH QUESTIONNAIRE - PHQ9
SUM OF ALL RESPONSES TO PHQ QUESTIONS 1-9: 11
SUM OF ALL RESPONSES TO PHQ QUESTIONS 1-9: 11
10. IF YOU CHECKED OFF ANY PROBLEMS, HOW DIFFICULT HAVE THESE PROBLEMS MADE IT FOR YOU TO DO YOUR WORK, TAKE CARE OF THINGS AT HOME, OR GET ALONG WITH OTHER PEOPLE: SOMEWHAT DIFFICULT

## 2024-10-16 NOTE — PROGRESS NOTES
"Gus is a 39 year old who is being evaluated via a billable video visit.          Assessment & Plan     Attention deficit hyperactivity disorder (ADHD), predominantly inattentive type  This is controlled.  He continues Vyvanse.  I reviewed the PDMP and there are no concerns today.  He is to follow-up in 6 months.  Controlled substance agreement and urine drug screen are up-to-date.  - lisdexamfetamine (VYVANSE) 30 MG capsule  Dispense: 30 capsule; Refill: 0    Insomnia, unspecified type  Will increase hydroxyzine to 25 mg nightly.  Discussed good sleep hygiene.  - hydrOXYzine HCl (ATARAX) 25 MG tablet  Dispense: 90 tablet; Refill: 2    Anxiety  Patient will take 1/2 to 1 tablet as needed during the day for anxiety.  - hydrOXYzine HCl (ATARAX) 25 MG tablet  Dispense: 90 tablet; Refill: 2    Mild recurrent major depression (H)  He continues bupropion.  This is controlled.      BMI  Estimated body mass index is 36.65 kg/m  as calculated from the following:    Height as of 9/13/24: 1.702 m (5' 7\").    Weight as of 9/13/24: 106.1 kg (234 lb).   Weight management plan: Discussed healthy diet and exercise guidelines        Dario Weber is a 39 year old, presenting for the following health issues:    Patient presents for ADD management.  Patient is taking Vyvanse 30 mg daily.  He is tolerating the medication well without any side effects.  He feels as though the medication helps him focus.  He is feeling less overwhelmed.  He is experiencing more clarity.  Patient occasionally has difficulty sleeping.  He has been taking hydroxyzine 20 mg at bedtime.  He occasionally takes it throughout the day when he is anxious.  He is taking bupropion  mg daily for depression which is well-controlled.      Follow Up and A.D.H.D      10/16/2024    11:14 AM   Additional Questions   Roomed by Parris     History of Present Illness       Reason for visit:  Adhd med follow up    He eats 2-3 servings of fruits and vegetables " daily.He consumes 0 sweetened beverage(s) daily.He exercises with enough effort to increase his heart rate 20 to 29 minutes per day.  He exercises with enough effort to increase his heart rate 3 or less days per week.   He is taking medications regularly.         Review of Systems  Constitutional, HEENT, cardiovascular, pulmonary, GI, , musculoskeletal, neuro, skin, endocrine and psych systems are negative, except as otherwise noted.      Objective           Vitals:  No vitals were obtained today due to virtual visit.    Physical Exam   GENERAL: alert and no distress  EYES: Eyes grossly normal to inspection.  No discharge or erythema, or obvious scleral/conjunctival abnormalities.  RESP: No audible wheeze, cough, or visible cyanosis.    SKIN: Visible skin clear. No significant rash, abnormal pigmentation or lesions.  NEURO: Cranial nerves grossly intact.  Mentation and speech appropriate for age.  PSYCH: Appropriate affect, tone, and pace of words        Video-Visit Details    Type of service:  Video Visit   Originating Location (pt. Location): Home    Distant Location (provider location):  On-site  Platform used for Video Visit: Scotty  Signed Electronically by: ELPIDIO Iyer CNP

## 2024-11-19 ENCOUNTER — MYC REFILL (OUTPATIENT)
Dept: FAMILY MEDICINE | Facility: CLINIC | Age: 39
End: 2024-11-19
Payer: COMMERCIAL

## 2024-11-19 DIAGNOSIS — F90.0 ATTENTION DEFICIT HYPERACTIVITY DISORDER (ADHD), PREDOMINANTLY INATTENTIVE TYPE: ICD-10-CM

## 2024-11-19 RX ORDER — LISDEXAMFETAMINE DIMESYLATE 30 MG/1
30 CAPSULE ORAL EVERY MORNING
Qty: 30 CAPSULE | Refills: 0 | Status: SHIPPED | OUTPATIENT
Start: 2024-11-19 | End: 2024-11-20

## 2024-11-20 RX ORDER — LISDEXAMFETAMINE DIMESYLATE 30 MG/1
30 CAPSULE ORAL EVERY MORNING
Qty: 30 CAPSULE | Refills: 0 | Status: SHIPPED | OUTPATIENT
Start: 2024-11-20

## 2024-12-19 ENCOUNTER — MYC REFILL (OUTPATIENT)
Dept: FAMILY MEDICINE | Facility: CLINIC | Age: 39
End: 2024-12-19
Payer: COMMERCIAL

## 2024-12-19 DIAGNOSIS — F90.0 ATTENTION DEFICIT HYPERACTIVITY DISORDER (ADHD), PREDOMINANTLY INATTENTIVE TYPE: ICD-10-CM

## 2024-12-19 RX ORDER — LISDEXAMFETAMINE DIMESYLATE 30 MG/1
30 CAPSULE ORAL EVERY MORNING
Qty: 30 CAPSULE | Refills: 0 | Status: SHIPPED | OUTPATIENT
Start: 2024-12-19

## 2025-01-15 ENCOUNTER — MYC MEDICAL ADVICE (OUTPATIENT)
Dept: FAMILY MEDICINE | Facility: CLINIC | Age: 40
End: 2025-01-15
Payer: COMMERCIAL

## 2025-01-15 DIAGNOSIS — F33.0 MILD RECURRENT MAJOR DEPRESSION: ICD-10-CM

## 2025-01-15 DIAGNOSIS — F41.9 ANXIETY: ICD-10-CM

## 2025-01-15 DIAGNOSIS — F90.0 ATTENTION DEFICIT HYPERACTIVITY DISORDER (ADHD), PREDOMINANTLY INATTENTIVE TYPE: ICD-10-CM

## 2025-01-15 NOTE — TELEPHONE ENCOUNTER
Order pended for provider review.     Chucho Tidwell RN  [facility]

## 2025-01-16 RX ORDER — BUPROPION HYDROCHLORIDE 300 MG/1
300 TABLET ORAL EVERY MORNING
Qty: 90 TABLET | Refills: 3 | Status: SHIPPED | OUTPATIENT
Start: 2025-01-16

## 2025-01-22 ENCOUNTER — MYC REFILL (OUTPATIENT)
Dept: FAMILY MEDICINE | Facility: CLINIC | Age: 40
End: 2025-01-22
Payer: COMMERCIAL

## 2025-01-22 DIAGNOSIS — F90.0 ATTENTION DEFICIT HYPERACTIVITY DISORDER (ADHD), PREDOMINANTLY INATTENTIVE TYPE: ICD-10-CM

## 2025-01-23 RX ORDER — LISDEXAMFETAMINE DIMESYLATE 30 MG/1
30 CAPSULE ORAL EVERY MORNING
Qty: 30 CAPSULE | Refills: 0 | Status: SHIPPED | OUTPATIENT
Start: 2025-01-23

## 2025-02-22 ENCOUNTER — MYC REFILL (OUTPATIENT)
Dept: FAMILY MEDICINE | Facility: CLINIC | Age: 40
End: 2025-02-22
Payer: COMMERCIAL

## 2025-02-22 DIAGNOSIS — F90.0 ATTENTION DEFICIT HYPERACTIVITY DISORDER (ADHD), PREDOMINANTLY INATTENTIVE TYPE: ICD-10-CM

## 2025-02-24 RX ORDER — LISDEXAMFETAMINE DIMESYLATE 30 MG/1
30 CAPSULE ORAL EVERY MORNING
Qty: 30 CAPSULE | Refills: 0 | Status: SHIPPED | OUTPATIENT
Start: 2025-02-24

## 2025-03-17 ENCOUNTER — PATIENT OUTREACH (OUTPATIENT)
Dept: CARE COORDINATION | Facility: CLINIC | Age: 40
End: 2025-03-17
Payer: COMMERCIAL

## 2025-03-25 ENCOUNTER — MYC REFILL (OUTPATIENT)
Dept: FAMILY MEDICINE | Facility: CLINIC | Age: 40
End: 2025-03-25
Payer: COMMERCIAL

## 2025-03-25 DIAGNOSIS — F90.0 ATTENTION DEFICIT HYPERACTIVITY DISORDER (ADHD), PREDOMINANTLY INATTENTIVE TYPE: ICD-10-CM

## 2025-03-25 RX ORDER — LISDEXAMFETAMINE DIMESYLATE 30 MG/1
30 CAPSULE ORAL EVERY MORNING
Qty: 30 CAPSULE | Refills: 0 | Status: SHIPPED | OUTPATIENT
Start: 2025-03-25

## 2025-04-23 ENCOUNTER — VIRTUAL VISIT (OUTPATIENT)
Dept: FAMILY MEDICINE | Facility: CLINIC | Age: 40
End: 2025-04-23
Payer: COMMERCIAL

## 2025-04-23 DIAGNOSIS — F90.0 ATTENTION DEFICIT HYPERACTIVITY DISORDER (ADHD), PREDOMINANTLY INATTENTIVE TYPE: Primary | ICD-10-CM

## 2025-04-23 DIAGNOSIS — G47.00 INSOMNIA, UNSPECIFIED TYPE: ICD-10-CM

## 2025-04-23 PROCEDURE — 98006 SYNCH AUDIO-VIDEO EST MOD 30: CPT | Performed by: NURSE PRACTITIONER

## 2025-04-23 RX ORDER — TRAZODONE HYDROCHLORIDE 50 MG/1
25-50 TABLET ORAL
Qty: 30 TABLET | Refills: 1 | Status: SHIPPED | OUTPATIENT
Start: 2025-04-23

## 2025-04-23 RX ORDER — LISDEXAMFETAMINE DIMESYLATE 30 MG/1
30 CAPSULE ORAL EVERY MORNING
Qty: 30 CAPSULE | Refills: 0 | Status: SHIPPED | OUTPATIENT
Start: 2025-04-23

## 2025-04-23 ASSESSMENT — ANXIETY QUESTIONNAIRES
3. WORRYING TOO MUCH ABOUT DIFFERENT THINGS: MORE THAN HALF THE DAYS
5. BEING SO RESTLESS THAT IT IS HARD TO SIT STILL: SEVERAL DAYS
GAD7 TOTAL SCORE: 13
GAD7 TOTAL SCORE: 13
7. FEELING AFRAID AS IF SOMETHING AWFUL MIGHT HAPPEN: SEVERAL DAYS
6. BECOMING EASILY ANNOYED OR IRRITABLE: MORE THAN HALF THE DAYS
1. FEELING NERVOUS, ANXIOUS, OR ON EDGE: NEARLY EVERY DAY
IF YOU CHECKED OFF ANY PROBLEMS ON THIS QUESTIONNAIRE, HOW DIFFICULT HAVE THESE PROBLEMS MADE IT FOR YOU TO DO YOUR WORK, TAKE CARE OF THINGS AT HOME, OR GET ALONG WITH OTHER PEOPLE: VERY DIFFICULT
2. NOT BEING ABLE TO STOP OR CONTROL WORRYING: NEARLY EVERY DAY
4. TROUBLE RELAXING: SEVERAL DAYS
8. IF YOU CHECKED OFF ANY PROBLEMS, HOW DIFFICULT HAVE THESE MADE IT FOR YOU TO DO YOUR WORK, TAKE CARE OF THINGS AT HOME, OR GET ALONG WITH OTHER PEOPLE?: VERY DIFFICULT
7. FEELING AFRAID AS IF SOMETHING AWFUL MIGHT HAPPEN: SEVERAL DAYS
GAD7 TOTAL SCORE: 13

## 2025-04-23 ASSESSMENT — PATIENT HEALTH QUESTIONNAIRE - PHQ9
SUM OF ALL RESPONSES TO PHQ QUESTIONS 1-9: 13
10. IF YOU CHECKED OFF ANY PROBLEMS, HOW DIFFICULT HAVE THESE PROBLEMS MADE IT FOR YOU TO DO YOUR WORK, TAKE CARE OF THINGS AT HOME, OR GET ALONG WITH OTHER PEOPLE: VERY DIFFICULT
SUM OF ALL RESPONSES TO PHQ QUESTIONS 1-9: 13

## 2025-04-23 NOTE — PROGRESS NOTES
"Gus is a 39 year old who is being evaluated via a billable video visit.        Assessment & Plan     Attention deficit hyperactivity disorder (ADHD), predominantly inattentive type  This is controlled.  He continues Vyvanse 30 mg daily and bupropion  mg daily.  He is up-to-date on controlled substance agreement and urine drug screen.  I reviewed the PDMP and there are no concerns today.  He is to follow-up for physical in 6 months.  - lisdexamfetamine (VYVANSE) 30 MG capsule  Dispense: 30 capsule; Refill: 0    Insomnia, unspecified type  Discussed that both Vyvanse and bupropion can affect sleep.  Discussed good sleep hygiene.  Recommend regular sleep pattern.  Will try trazodone 25 to 50 mg at bedtime as needed.  Educated on indications and side effects.  He is to avoid taking this with other sedatives.  He is content with the plan.  - traZODone (DESYREL) 50 MG tablet  Dispense: 30 tablet; Refill: 1      The longitudinal plan of care for the diagnosis(es)/condition(s) as documented were addressed during this visit. Due to the added complexity in care, I will continue to support Gus in the subsequent management and with ongoing continuity of care.        BMI  Estimated body mass index is 36.65 kg/m  as calculated from the following:    Height as of 9/13/24: 1.702 m (5' 7\").    Weight as of 9/13/24: 106.1 kg (234 lb).   Weight management plan: Discussed healthy diet and exercise guidelines        Subjective   Gus is a 39 year old, presenting for the following health issues:  Medication Follow-up      4/23/2025    12:15 PM   Additional Questions   Roomed by Parris     History of Present Illness       Mental Health Follow-up:  Patient presents to follow-up on Depression & Anxiety.Patient's depression since last visit has been:  Medium  The patient is having other symptoms associated with depression.  Patient's anxiety since last visit has been:  Bad  The patient is having other symptoms associated with " anxiety.  Any significant life events: job concerns, financial concerns and grief or loss  Patient is feeling anxious or having panic attacks.  Patient has no concerns about alcohol or drug use.    He eats 2-3 servings of fruits and vegetables daily.He consumes 0 sweetened beverage(s) daily.He exercises with enough effort to increase his heart rate 20 to 29 minutes per day.  He exercises with enough effort to increase his heart rate 4 days per week.   He is taking medications regularly.        Patient presents today for medication management.  She has ADHD and is taking bupropion  mg daily.  He started Vyvanse 30 mg daily.  Patient feels as though the medication allows him to focus and complete tasks.  He feels as though he has an improved memory.  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  He continues to struggle with some anxiety at bedtime.  He is try to avoid using electronics.  He feels as though hydroxyzine caused him to feel tired the next day.  He has difficulty waking up after taking the hydroxyzine.  He is interested in another sleep agent.    Review of Systems  Constitutional, HEENT, cardiovascular, pulmonary, GI, , musculoskeletal, neuro, skin, endocrine and psych systems are negative, except as otherwise noted.      Objective           Vitals:  No vitals were obtained today due to virtual visit.    Physical Exam   GENERAL: alert and no distress  EYES: Eyes grossly normal to inspection.  No discharge or erythema, or obvious scleral/conjunctival abnormalities.  RESP: No audible wheeze, cough, or visible cyanosis.    SKIN: Visible skin clear. No significant rash, abnormal pigmentation or lesions.  NEURO: Cranial nerves grossly intact.  Mentation and speech appropriate for age.  PSYCH: Appropriate affect, tone, and pace of words        Video-Visit Details    Type of service:  Video Visit   Originating Location (pt. Location): Home    Distant Location (provider location):  On-site  Platform  used for Video Visit: Scotty  Signed Electronically by: ELPIDIO Iyer CNP

## 2025-05-21 ENCOUNTER — MYC REFILL (OUTPATIENT)
Dept: FAMILY MEDICINE | Facility: CLINIC | Age: 40
End: 2025-05-21
Payer: COMMERCIAL

## 2025-05-21 DIAGNOSIS — F90.0 ATTENTION DEFICIT HYPERACTIVITY DISORDER (ADHD), PREDOMINANTLY INATTENTIVE TYPE: ICD-10-CM

## 2025-05-21 RX ORDER — LISDEXAMFETAMINE DIMESYLATE 30 MG/1
30 CAPSULE ORAL EVERY MORNING
Qty: 30 CAPSULE | Refills: 0 | Status: SHIPPED | OUTPATIENT
Start: 2025-05-21

## 2025-06-08 ENCOUNTER — MYC REFILL (OUTPATIENT)
Dept: FAMILY MEDICINE | Facility: CLINIC | Age: 40
End: 2025-06-08
Payer: COMMERCIAL

## 2025-06-08 DIAGNOSIS — F90.0 ATTENTION DEFICIT HYPERACTIVITY DISORDER (ADHD), PREDOMINANTLY INATTENTIVE TYPE: ICD-10-CM

## 2025-06-09 RX ORDER — LISDEXAMFETAMINE DIMESYLATE 30 MG/1
30 CAPSULE ORAL EVERY MORNING
Qty: 30 CAPSULE | Refills: 0 | Status: SHIPPED | OUTPATIENT
Start: 2025-06-09

## 2025-06-10 NOTE — TELEPHONE ENCOUNTER
Message sent to patient. Per Elvira Rodas, she does not fill for 90 days of controlled substances.

## 2025-06-27 ENCOUNTER — MYC REFILL (OUTPATIENT)
Dept: FAMILY MEDICINE | Facility: CLINIC | Age: 40
End: 2025-06-27
Payer: COMMERCIAL

## 2025-06-27 DIAGNOSIS — F90.0 ATTENTION DEFICIT HYPERACTIVITY DISORDER (ADHD), PREDOMINANTLY INATTENTIVE TYPE: ICD-10-CM

## 2025-06-30 RX ORDER — LISDEXAMFETAMINE DIMESYLATE 30 MG/1
30 CAPSULE ORAL EVERY MORNING
Qty: 30 CAPSULE | Refills: 0 | Status: SHIPPED | OUTPATIENT
Start: 2025-06-30

## 2025-07-19 ENCOUNTER — MYC MEDICAL ADVICE (OUTPATIENT)
Dept: FAMILY MEDICINE | Facility: CLINIC | Age: 40
End: 2025-07-19
Payer: COMMERCIAL

## 2025-07-23 ENCOUNTER — OFFICE VISIT (OUTPATIENT)
Dept: FAMILY MEDICINE | Facility: CLINIC | Age: 40
End: 2025-07-23
Payer: COMMERCIAL

## 2025-07-23 VITALS
HEIGHT: 67 IN | BODY MASS INDEX: 35.31 KG/M2 | DIASTOLIC BLOOD PRESSURE: 76 MMHG | RESPIRATION RATE: 22 BRPM | TEMPERATURE: 97.3 F | HEART RATE: 57 BPM | OXYGEN SATURATION: 99 % | WEIGHT: 225 LBS | SYSTOLIC BLOOD PRESSURE: 119 MMHG

## 2025-07-23 DIAGNOSIS — G47.00 INSOMNIA, UNSPECIFIED TYPE: ICD-10-CM

## 2025-07-23 DIAGNOSIS — F33.0 MILD RECURRENT MAJOR DEPRESSION: ICD-10-CM

## 2025-07-23 DIAGNOSIS — F41.9 ANXIETY: ICD-10-CM

## 2025-07-23 DIAGNOSIS — R21 RASH: ICD-10-CM

## 2025-07-23 DIAGNOSIS — Z79.899 MEDICATION MANAGEMENT: ICD-10-CM

## 2025-07-23 DIAGNOSIS — F90.0 ATTENTION DEFICIT HYPERACTIVITY DISORDER (ADHD), PREDOMINANTLY INATTENTIVE TYPE: Primary | ICD-10-CM

## 2025-07-23 LAB
AMPHETAMINES UR QL SCN: ABNORMAL
BARBITURATES UR QL SCN: ABNORMAL
BENZODIAZ UR QL SCN: ABNORMAL
BZE UR QL SCN: ABNORMAL
CANNABINOIDS UR QL SCN: ABNORMAL
FENTANYL UR QL: ABNORMAL
OPIATES UR QL SCN: ABNORMAL
PCP QUAL URINE (ROCHE): ABNORMAL

## 2025-07-23 PROCEDURE — 1125F AMNT PAIN NOTED PAIN PRSNT: CPT | Performed by: NURSE PRACTITIONER

## 2025-07-23 PROCEDURE — 99214 OFFICE O/P EST MOD 30 MIN: CPT | Performed by: NURSE PRACTITIONER

## 2025-07-23 PROCEDURE — 3074F SYST BP LT 130 MM HG: CPT | Performed by: NURSE PRACTITIONER

## 2025-07-23 PROCEDURE — G2211 COMPLEX E/M VISIT ADD ON: HCPCS | Performed by: NURSE PRACTITIONER

## 2025-07-23 PROCEDURE — 3078F DIAST BP <80 MM HG: CPT | Performed by: NURSE PRACTITIONER

## 2025-07-23 PROCEDURE — 80307 DRUG TEST PRSMV CHEM ANLYZR: CPT | Performed by: NURSE PRACTITIONER

## 2025-07-23 RX ORDER — BUPROPION HYDROCHLORIDE 300 MG/1
300 TABLET ORAL EVERY MORNING
Qty: 90 TABLET | Refills: 3 | Status: SHIPPED | OUTPATIENT
Start: 2025-07-23

## 2025-07-23 RX ORDER — LISDEXAMFETAMINE DIMESYLATE 30 MG/1
30 CAPSULE ORAL EVERY MORNING
Qty: 30 CAPSULE | Refills: 0 | Status: CANCELLED | OUTPATIENT
Start: 2025-07-23

## 2025-07-23 RX ORDER — PREDNISONE 20 MG/1
20 TABLET ORAL 2 TIMES DAILY
Qty: 10 TABLET | Refills: 0 | Status: SHIPPED | OUTPATIENT
Start: 2025-07-23 | End: 2025-07-28

## 2025-07-23 RX ORDER — LISDEXAMFETAMINE DIMESYLATE 40 MG/1
40 CAPSULE ORAL EVERY MORNING
Qty: 30 CAPSULE | Refills: 0 | Status: SHIPPED | OUTPATIENT
Start: 2025-07-27

## 2025-07-23 RX ORDER — TRAZODONE HYDROCHLORIDE 50 MG/1
25-50 TABLET ORAL
Qty: 30 TABLET | Refills: 1 | Status: SHIPPED | OUTPATIENT
Start: 2025-07-23

## 2025-07-23 ASSESSMENT — ANXIETY QUESTIONNAIRES
8. IF YOU CHECKED OFF ANY PROBLEMS, HOW DIFFICULT HAVE THESE MADE IT FOR YOU TO DO YOUR WORK, TAKE CARE OF THINGS AT HOME, OR GET ALONG WITH OTHER PEOPLE?: VERY DIFFICULT
6. BECOMING EASILY ANNOYED OR IRRITABLE: NEARLY EVERY DAY
5. BEING SO RESTLESS THAT IT IS HARD TO SIT STILL: MORE THAN HALF THE DAYS
GAD7 TOTAL SCORE: 18
7. FEELING AFRAID AS IF SOMETHING AWFUL MIGHT HAPPEN: MORE THAN HALF THE DAYS
2. NOT BEING ABLE TO STOP OR CONTROL WORRYING: NEARLY EVERY DAY
3. WORRYING TOO MUCH ABOUT DIFFERENT THINGS: NEARLY EVERY DAY
4. TROUBLE RELAXING: MORE THAN HALF THE DAYS
1. FEELING NERVOUS, ANXIOUS, OR ON EDGE: NEARLY EVERY DAY
7. FEELING AFRAID AS IF SOMETHING AWFUL MIGHT HAPPEN: MORE THAN HALF THE DAYS
GAD7 TOTAL SCORE: 18
IF YOU CHECKED OFF ANY PROBLEMS ON THIS QUESTIONNAIRE, HOW DIFFICULT HAVE THESE PROBLEMS MADE IT FOR YOU TO DO YOUR WORK, TAKE CARE OF THINGS AT HOME, OR GET ALONG WITH OTHER PEOPLE: VERY DIFFICULT
GAD7 TOTAL SCORE: 18

## 2025-07-23 ASSESSMENT — PATIENT HEALTH QUESTIONNAIRE - PHQ9
SUM OF ALL RESPONSES TO PHQ QUESTIONS 1-9: 14
SUM OF ALL RESPONSES TO PHQ QUESTIONS 1-9: 14
10. IF YOU CHECKED OFF ANY PROBLEMS, HOW DIFFICULT HAVE THESE PROBLEMS MADE IT FOR YOU TO DO YOUR WORK, TAKE CARE OF THINGS AT HOME, OR GET ALONG WITH OTHER PEOPLE: VERY DIFFICULT

## 2025-07-23 ASSESSMENT — PAIN SCALES - GENERAL: PAINLEVEL_OUTOF10: MODERATE PAIN (4)

## 2025-07-23 NOTE — PROGRESS NOTES
Assessment and Plan:     Attention deficit hyperactivity disorder (ADHD), predominantly inattentive type  Urine drug screen and controlled substance agreement obtained.  He continues bupropion as prescribed.  Will increase Vyvanse to 40 mg daily.  Educated on its indications and side effects.  Anticipate follow-up in 1 month.  - buPROPion (WELLBUTRIN XL) 300 MG 24 hr tablet  Dispense: 90 tablet; Refill: 3  - lisdexamfetamine (VYVANSE) 40 MG capsule  Dispense: 30 capsule; Refill: 0    Insomnia, unspecified type  Discussed good sleep hygiene.  He continues trazodone as needed.  - traZODone (DESYREL) 50 MG tablet  Dispense: 30 tablet; Refill: 1    Rash  He has a history of an allergic reaction in the woods.  Provided prescription for prednisone 20 mg twice daily for 5 days in case he develops a rash while camping.  Educated on its indications and side effects.  I encouraged him to consider taking cetirizine 10 mg daily for underlying allergies.  - predniSONE (DELTASONE) 20 MG tablet  Dispense: 10 tablet; Refill: 0    Anxiety  Mild recurrent major depression  Patient feels as though his symptoms are controlled.  He continues bupropion.  - buPROPion (WELLBUTRIN XL) 300 MG 24 hr tablet  Dispense: 90 tablet; Refill: 3    Medication management  - Urine Drug Screen  - Urine Drug Screen    The longitudinal plan of care for the diagnosis(es)/condition(s) as documented were addressed during this visit. Due to the added complexity in care, I will continue to support Gus in the subsequent management and with ongoing continuity of care.      Subjective:     Seamus is a 39 year old male presenting to the clinic for medication management.  Patient is traveling to a camping site this weekend.  The last time he was at this camping site, he developed facial swelling and a rash on his arms.  He had generalized itching and states it was hard to breathe.  Ultimately, he was treated with prednisone.  He requests prescription for  Prednisone today in case he is exposed to an allergy.  He has been taking fexofenadine daily.  He has had rhinorrhea, sneezing, itchy eyes.  Patient is prescribed Bupropion  mg daily for anxiety and depression. and Vyvanse 30 mg daily for ADHD.  Patient continues to experience some irritability.  Patient feels as though the Vyvanse allows him to focus and complete tasks.  Unfortunately, he is inconsistent with when he takes the medication.  He has some difficulty sleeping at night.  He is prescribed trazodone 25-50 mg at bedtime as needed which works well when he has enough time to allow it to work appropriately.  Patient is interested in increasing the dose of Vyvanse to see if this improves his mood and focus.    Reviewof Systems: A complete 14 point review of systems was obtained and is negative or as stated in the history of present illness.    Social History     Socioeconomic History    Marital status: Single     Spouse name: Not on file    Number of children: Not on file    Years of education: Not on file    Highest education level: Not on file   Occupational History    Not on file   Tobacco Use    Smoking status: Former     Passive exposure: Past    Smokeless tobacco: Never    Tobacco comments:     age 18 to 35 with intermittent quitting   Vaping Use    Vaping status: Never Used   Substance and Sexual Activity    Alcohol use: No    Drug use: Yes     Types: Marijuana    Sexual activity: Yes     Partners: Female     Comment: in relationship    Other Topics Concern    Not on file   Social History Narrative    Works as political organizers.  Engaged. Daughter Poppy 4 yo.   In a Master's program for Advocacy in [employer]     Social Drivers of Health     Financial Resource Strain: Low Risk  (9/13/2024)    Financial Resource Strain     Within the past 12 months, have you or your family members you live with been unable to get utilities (heat, electricity) when it was really needed?: No   Food  Insecurity: Low Risk  (9/13/2024)    Food Insecurity     Within the past 12 months, did you worry that your food would run out before you got money to buy more?: No     Within the past 12 months, did the food you bought just not last and you didn t have money to get more?: No   Transportation Needs: Low Risk  (9/13/2024)    Transportation Needs     Within the past 12 months, has lack of transportation kept you from medical appointments, getting your medicines, non-medical meetings or appointments, work, or from getting things that you need?: No   Physical Activity: Insufficiently Active (9/13/2024)    Exercise Vital Sign     Days of Exercise per Week: 3 days     Minutes of Exercise per Session: 40 min   Stress: Stress Concern Present (9/13/2024)    [employer] [place] [employer] - Occupational Stress Questionnaire     Feeling of Stress : Very much   Social Connections: Unknown (9/13/2024)    Social Connection and Isolation Panel [NHANES]     Frequency of Communication with Friends and Family: Not on file     Frequency of Social Gatherings with Friends and Family: Once a week     Attends Jew Services: Not on file     Active Member of Clubs or Organizations: Not on file     Attends Club or Organization Meetings: Not on file     Marital Status: Not on file   Interpersonal Safety: Low Risk  (7/23/2025)    Interpersonal Safety     Do you feel physically and emotionally safe where you currently live?: Yes     Within the past 12 months, have you been hit, slapped, kicked or otherwise physically hurt by someone?: No     Within the past 12 months, have you been humiliated or emotionally abused in other ways by your partner or ex-partner?: No   Housing Stability: Low Risk  (9/13/2024)    Housing Stability     Do you have housing? : Yes     Are you worried about losing your housing?: No       Active Ambulatory Problems     Diagnosis Date Noted    ADHD, Predominantly Inattentive Type     Controlled substance  "agreement signed 11/16/2015    Headache 03/04/2020    Obesity (BMI 35.0-39.9 without comorbidity)     Dyslipidemia     Anxiety     Migraine     Class 2 severe obesity due to excess calories with serious comorbidity in adult (H) 09/13/2024    Mild recurrent major depression 09/13/2024     Resolved Ambulatory Problems     Diagnosis Date Noted    Deviated Nasal Septum (Acquired)     Hypertrophied Nasal Turbinate     Major depressive disorder, single episode     Morbid obesity (H) 01/28/2022     Past Medical History:   Diagnosis Date    ADD (attention deficit disorder)     ADHD     Joint pain        Family History   Problem Relation Age of Onset    No Known Problems Mother     No Known Problems Father         circulation issues    Cancer Brother         angiosarcoma       Objective:     /76   Pulse 57   Temp 97.3  F (36.3  C)   Resp 22   Ht 1.702 m (5' 7\")   Wt 102.1 kg (225 lb)   SpO2 99%   BMI 35.24 kg/m      Patient is alert, in no obvious distress.   Skin: Warm, dry.  No lesions or rashes.  Skin turgor rapid return.   HEENT:  Head normocephalic, atraumatic.  Eyes normal. Ears normal.  Nose patent, mucosa pink.  Oropharynx mucosa pink.  No lesions or tonsillar enlargement.   Neck: Supple, no lymphadenopathy.   Lungs:  Clear to auscultation. Respirations even and unlabored.  No wheezing or rales noted.   Heart:  Regular rate and rhythm.  No murmurs.           Answers submitted by the patient for this visit:  Patient Health Questionnaire (Submitted on 7/23/2025)  If you checked off any problems, how difficult have these problems made it for you to do your work, take care of things at home, or get along with other people?: Very difficult  PHQ9 TOTAL SCORE: 14  Patient Health Questionnaire (G7) (Submitted on 7/23/2025)  ABRAM 7 TOTAL SCORE: 18  Depression / Anxiety Questionnaire (Submitted on 7/23/2025)  Chief Complaint: Chronic problems general questions HPI Form  Depression/Anxiety: Depression & " Anxiety  Depression & Anxiety (Submitted on 7/23/2025)  Chief Complaint: Chronic problems general questions HPI Form  Status since last visit:: worse  Anxiety since last: : worse  Other associated symptoms of depression:: No  Other associated symotome: : No  Significant life event: : relationship concerns, job concerns, financial concerns, housing concerns, grief or loss, health concerns, other  Anxious:: Yes  Current substance use:: No  General Questionnaire (Submitted on 7/23/2025)  Chief Complaint: Chronic problems general questions HPI Form  What is the reason for your visit today? : rx check in  How many servings of fruits and vegetables do you eat daily?: 2-3  On average, how many sweetened beverages do you drink each day (Examples: soda, juice, sweet tea, etc.  Do NOT count diet or artificially sweetened beverages)?: 0  How many minutes a day do you exercise enough to make your heart beat faster?: 10 to 19  How many days a week do you exercise enough to make your heart beat faster?: 3 or less  How many days per week do you miss taking your medication?: 1  What makes it hard for you to take your medication every day?: remembering to take  Questionnaire about: Chronic problems general questions HPI Form (Submitted on 7/23/2025)  Chief Complaint: Chronic problems general questions HPI Form

## 2025-08-12 ENCOUNTER — MYC MEDICAL ADVICE (OUTPATIENT)
Dept: FAMILY MEDICINE | Facility: CLINIC | Age: 40
End: 2025-08-12
Payer: COMMERCIAL

## 2025-08-14 ENCOUNTER — PATIENT OUTREACH (OUTPATIENT)
Dept: CARE COORDINATION | Facility: CLINIC | Age: 40
End: 2025-08-14
Payer: COMMERCIAL

## 2025-08-14 RX ORDER — IVERMECTIN 3 MG/1
TABLET ORAL
COMMUNITY
Start: 2025-08-11

## 2025-08-19 ENCOUNTER — VIRTUAL VISIT (OUTPATIENT)
Dept: FAMILY MEDICINE | Facility: CLINIC | Age: 40
End: 2025-08-19
Payer: COMMERCIAL

## 2025-08-19 DIAGNOSIS — F33.0 MILD RECURRENT MAJOR DEPRESSION: ICD-10-CM

## 2025-08-19 DIAGNOSIS — F90.0 ATTENTION DEFICIT HYPERACTIVITY DISORDER (ADHD), PREDOMINANTLY INATTENTIVE TYPE: Primary | ICD-10-CM

## 2025-08-19 DIAGNOSIS — F41.9 ANXIETY: ICD-10-CM

## 2025-08-19 PROCEDURE — 98006 SYNCH AUDIO-VIDEO EST MOD 30: CPT | Performed by: NURSE PRACTITIONER

## 2025-08-19 PROCEDURE — 1126F AMNT PAIN NOTED NONE PRSNT: CPT | Mod: 95 | Performed by: NURSE PRACTITIONER

## 2025-08-19 RX ORDER — LISDEXAMFETAMINE DIMESYLATE 20 MG/1
20 CAPSULE ORAL EVERY MORNING
Qty: 30 CAPSULE | Refills: 0 | Status: SHIPPED | OUTPATIENT
Start: 2025-08-23

## 2025-08-19 ASSESSMENT — ANXIETY QUESTIONNAIRES
6. BECOMING EASILY ANNOYED OR IRRITABLE: NEARLY EVERY DAY
IF YOU CHECKED OFF ANY PROBLEMS ON THIS QUESTIONNAIRE, HOW DIFFICULT HAVE THESE PROBLEMS MADE IT FOR YOU TO DO YOUR WORK, TAKE CARE OF THINGS AT HOME, OR GET ALONG WITH OTHER PEOPLE: VERY DIFFICULT
7. FEELING AFRAID AS IF SOMETHING AWFUL MIGHT HAPPEN: MORE THAN HALF THE DAYS
7. FEELING AFRAID AS IF SOMETHING AWFUL MIGHT HAPPEN: MORE THAN HALF THE DAYS
5. BEING SO RESTLESS THAT IT IS HARD TO SIT STILL: NEARLY EVERY DAY
3. WORRYING TOO MUCH ABOUT DIFFERENT THINGS: NEARLY EVERY DAY
4. TROUBLE RELAXING: NEARLY EVERY DAY
8. IF YOU CHECKED OFF ANY PROBLEMS, HOW DIFFICULT HAVE THESE MADE IT FOR YOU TO DO YOUR WORK, TAKE CARE OF THINGS AT HOME, OR GET ALONG WITH OTHER PEOPLE?: VERY DIFFICULT
2. NOT BEING ABLE TO STOP OR CONTROL WORRYING: NEARLY EVERY DAY
GAD7 TOTAL SCORE: 20
1. FEELING NERVOUS, ANXIOUS, OR ON EDGE: NEARLY EVERY DAY
GAD7 TOTAL SCORE: 20
GAD7 TOTAL SCORE: 20

## 2025-08-19 ASSESSMENT — PATIENT HEALTH QUESTIONNAIRE - PHQ9
SUM OF ALL RESPONSES TO PHQ QUESTIONS 1-9: 20
10. IF YOU CHECKED OFF ANY PROBLEMS, HOW DIFFICULT HAVE THESE PROBLEMS MADE IT FOR YOU TO DO YOUR WORK, TAKE CARE OF THINGS AT HOME, OR GET ALONG WITH OTHER PEOPLE: VERY DIFFICULT
SUM OF ALL RESPONSES TO PHQ QUESTIONS 1-9: 20

## 2025-08-28 ENCOUNTER — PATIENT OUTREACH (OUTPATIENT)
Dept: CARE COORDINATION | Facility: CLINIC | Age: 40
End: 2025-08-28
Payer: COMMERCIAL